# Patient Record
Sex: FEMALE | Race: WHITE | NOT HISPANIC OR LATINO | Employment: FULL TIME | URBAN - METROPOLITAN AREA
[De-identification: names, ages, dates, MRNs, and addresses within clinical notes are randomized per-mention and may not be internally consistent; named-entity substitution may affect disease eponyms.]

---

## 2019-02-22 ENCOUNTER — OFFICE VISIT (OUTPATIENT)
Dept: FAMILY MEDICINE | Facility: CLINIC | Age: 39
End: 2019-02-22
Payer: MEDICAID

## 2019-02-22 VITALS
BODY MASS INDEX: 27.8 KG/M2 | HEIGHT: 66 IN | RESPIRATION RATE: 18 BRPM | TEMPERATURE: 98 F | DIASTOLIC BLOOD PRESSURE: 86 MMHG | WEIGHT: 173 LBS | OXYGEN SATURATION: 98 % | HEART RATE: 87 BPM | SYSTOLIC BLOOD PRESSURE: 138 MMHG

## 2019-02-22 DIAGNOSIS — B07.9 VIRAL WARTS, UNSPECIFIED TYPE: ICD-10-CM

## 2019-02-22 DIAGNOSIS — N92.6 IRREGULAR PERIODS: ICD-10-CM

## 2019-02-22 DIAGNOSIS — D48.5 NEOPLASM OF UNCERTAIN BEHAVIOR OF SKIN: ICD-10-CM

## 2019-02-22 DIAGNOSIS — E78.5 HYPERLIPIDEMIA, UNSPECIFIED HYPERLIPIDEMIA TYPE: ICD-10-CM

## 2019-02-22 DIAGNOSIS — D64.9 ANEMIA, UNSPECIFIED TYPE: Primary | ICD-10-CM

## 2019-02-22 DIAGNOSIS — I10 HYPERTENSION, UNSPECIFIED TYPE: ICD-10-CM

## 2019-02-22 DIAGNOSIS — Z12.4 SCREENING FOR MALIGNANT NEOPLASM OF CERVIX: ICD-10-CM

## 2019-02-22 PROCEDURE — 99214 OFFICE O/P EST MOD 30 MIN: CPT | Mod: 25,,, | Performed by: FAMILY MEDICINE

## 2019-02-22 PROCEDURE — 11300 PR SHAV SKIN LES < 0.5 CM TRUNK,ARM,LEG: ICD-10-PCS | Mod: ,,, | Performed by: FAMILY MEDICINE

## 2019-02-22 PROCEDURE — 99214 PR OFFICE/OUTPT VISIT, EST, LEVL IV, 30-39 MIN: ICD-10-PCS | Mod: 25,,, | Performed by: FAMILY MEDICINE

## 2019-02-22 PROCEDURE — 17110 PR DESTRUCTION BENIGN LESIONS UP TO 14: ICD-10-PCS | Mod: ,,, | Performed by: FAMILY MEDICINE

## 2019-02-22 PROCEDURE — 11300 SHAVE SKIN LESION 0.5 CM/<: CPT | Mod: ,,, | Performed by: FAMILY MEDICINE

## 2019-02-22 PROCEDURE — 17110 DESTRUCTION B9 LES UP TO 14: CPT | Mod: ,,, | Performed by: FAMILY MEDICINE

## 2019-02-22 RX ORDER — VALACYCLOVIR HYDROCHLORIDE 1 G/1
TABLET, FILM COATED ORAL
Refills: 5 | COMMUNITY
Start: 2019-01-21 | End: 2020-05-20 | Stop reason: SDUPTHER

## 2019-02-22 RX ORDER — HYDROCHLOROTHIAZIDE 25 MG/1
TABLET ORAL
Refills: 1 | COMMUNITY
Start: 2019-01-23 | End: 2019-02-22 | Stop reason: SDUPTHER

## 2019-02-22 RX ORDER — LABETALOL 300 MG/1
0.5 TABLET, FILM COATED ORAL 2 TIMES DAILY
Refills: 1 | COMMUNITY
Start: 2019-01-23 | End: 2019-02-22 | Stop reason: SDUPTHER

## 2019-02-22 RX ORDER — LABETALOL 300 MG/1
300 TABLET, FILM COATED ORAL 2 TIMES DAILY
Qty: 60 TABLET | Refills: 3 | Status: SHIPPED | OUTPATIENT
Start: 2019-02-22 | End: 2019-03-22

## 2019-02-22 RX ORDER — ETONOGESTREL AND ETHINYL ESTRADIOL .12; .015 MG/D; MG/D
1 INSERT, EXTENDED RELEASE VAGINAL
Qty: 1 EACH | Refills: 5 | Status: SHIPPED | OUTPATIENT
Start: 2019-02-22 | End: 2019-07-19 | Stop reason: SDUPTHER

## 2019-02-22 RX ORDER — ETONOGESTREL AND ETHINYL ESTRADIOL .12; .015 MG/D; MG/D
INSERT, EXTENDED RELEASE VAGINAL
Refills: 5 | COMMUNITY
Start: 2019-01-23 | End: 2019-02-22 | Stop reason: SDUPTHER

## 2019-02-22 RX ORDER — HYDROCHLOROTHIAZIDE 25 MG/1
25 TABLET ORAL DAILY
Qty: 30 TABLET | Refills: 5 | Status: SHIPPED | OUTPATIENT
Start: 2019-02-22 | End: 2019-07-23 | Stop reason: SDUPTHER

## 2019-02-22 NOTE — PROGRESS NOTES
Subjective:      Patient ID: Breanna Dawson is a 38 y.o. female.    Chief Complaint: Follow-up      HPI:  38-year-old past medical history of anemia, irregular cycles, hypertension, hyperlipidemia who presents with warts and abnormal lesion in the nose.  Warts began several weeks ago.  Son with similar lesions.  Getting worse and spreading.    Has had lesion in her in her right nostril times several years.  Similar in size.  It itches, gets irritated and hurts at times.    Checks blood pressure at home. blood pressure normally runs well.  No side effects to meds.  Has been taking only 1/2 twice a day of her labetalol due to fatigue with normal dosage.    Past Medical History:   Diagnosis Date    Hypertension      Past Surgical History:   Procedure Laterality Date    CHOLECYSTECTOMY       Family History   Problem Relation Age of Onset    Hypertension Mother     Hypertension Brother      Social History     Socioeconomic History    Marital status: Unknown     Spouse name: Not on file    Number of children: Not on file    Years of education: Not on file    Highest education level: Not on file   Social Needs    Financial resource strain: Not on file    Food insecurity - worry: Not on file    Food insecurity - inability: Not on file    Transportation needs - medical: Not on file    Transportation needs - non-medical: Not on file   Occupational History    Not on file   Tobacco Use    Smoking status: Current Some Day Smoker     Types: Cigarettes   Substance and Sexual Activity    Alcohol use: No     Frequency: Never    Drug use: No    Sexual activity: Not on file   Other Topics Concern    Not on file   Social History Narrative    Not on file     Review of patient's allergies indicates:  No Known Allergies    Review of Systems   Constitutional: Negative for activity change, appetite change, chills, fatigue and fever.   HENT: Negative for congestion, ear pain, postnasal drip, rhinorrhea, sinus pressure,  "sinus pain and sore throat.    Eyes: Negative for pain and redness.   Respiratory: Negative for cough, chest tightness and shortness of breath.    Cardiovascular: Negative for chest pain and leg swelling.   Gastrointestinal: Negative for abdominal distention, abdominal pain, constipation, diarrhea, nausea and vomiting.   Endocrine: Negative for cold intolerance and heat intolerance.   Genitourinary: Negative for dysuria, frequency and hematuria.   Musculoskeletal: Negative for arthralgias, back pain and joint swelling.   Skin: Negative for pallor.   Neurological: Negative for dizziness and light-headedness.   Psychiatric/Behavioral: Negative for agitation, decreased concentration and hallucinations. The patient is not nervous/anxious.        Objective:       /86 (BP Location: Left arm, Patient Position: Sitting, BP Method: Medium (Manual))   Pulse 87   Temp 98.3 °F (36.8 °C) (Oral)   Resp 18   Ht 5' 6" (1.676 m)   Wt 78.5 kg (173 lb)   SpO2 98%   BMI 27.92 kg/m²   Physical Exam   Constitutional: She is oriented to person, place, and time. She appears well-developed and well-nourished.   HENT:   Head: Normocephalic and atraumatic.   Nose: Nose normal.   Eyes: Conjunctivae and EOM are normal. Pupils are equal, round, and reactive to light.   Neck: Normal range of motion. Neck supple.   Pulmonary/Chest: Effort normal.   Abdominal: Soft.   Musculoskeletal: Normal range of motion.   Neurological: She is alert and oriented to person, place, and time.   Skin: Skin is warm and dry.   3 warts on left hand - 2-3 mm in size    2 mm hyperkeratotic lesion right inner nostril.    Psychiatric: She has a normal mood and affect. Her behavior is normal. Thought content normal.       Assessment:     1. Anemia, unspecified type    2. Hypertension, unspecified type    3. Hyperlipidemia, unspecified hyperlipidemia type    4. Irregular periods    5. Screening for malignant neoplasm of cervix    6. Viral warts, unspecified " type    7. Neoplasm of uncertain behavior of skin        Plan:   Anemia, unspecified type  -     NUVARING 0.12-0.015 mg/24 hr vaginal ring; Place 1 each vaginally every 28 days.  Dispense: 1 each; Refill: 5    Hypertension, unspecified type  -     hydroCHLOROthiazide (HYDRODIURIL) 25 MG tablet; Take 1 tablet (25 mg total) by mouth once daily.  Dispense: 30 tablet; Refill: 5  -     labetalol (NORMODYNE) 300 MG tablet; Take 1 tablet (300 mg total) by mouth 2 (two) times daily.  Dispense: 60 tablet; Refill: 3    Hyperlipidemia, unspecified hyperlipidemia type    Irregular periods  -     NUVARING 0.12-0.015 mg/24 hr vaginal ring; Place 1 each vaginally every 28 days.  Dispense: 1 each; Refill: 5    Screening for malignant neoplasm of cervix    Viral warts, unspecified type  -     Cryotherapy    Neoplasm of uncertain behavior of skin  -     Excision of Benign Lesion      Medication List with Changes/Refills   Current Medications    VALACYCLOVIR (VALTREX) 1000 MG TABLET    TK 2 TS PO Q 12 H   Changed and/or Refilled Medications    Modified Medication Previous Medication    HYDROCHLOROTHIAZIDE (HYDRODIURIL) 25 MG TABLET hydroCHLOROthiazide (HYDRODIURIL) 25 MG tablet       Take 1 tablet (25 mg total) by mouth once daily.    TK 1 T PO QD    LABETALOL (NORMODYNE) 300 MG TABLET labetalol (NORMODYNE) 300 MG tablet       Take 1 tablet (300 mg total) by mouth 2 (two) times daily.    Take 0.5 tablets by mouth 2 (two) times daily.    NUVARING 0.12-0.015 MG/24 HR VAGINAL RING NUVARING 0.12-0.015 mg/24 hr vaginal ring       Place 1 each vaginally every 28 days.

## 2019-02-22 NOTE — PROCEDURES
"Cryotherapy  Date/Time: 2/22/2019 1:39 PM  Performed by: Eugenio Awan MD  Authorized by: Eugenio Awan MD     Consent Done?:  Yes (Verbal)  Time out: Immediately prior to procedure a "time out" was called to verify the correct patient, procedure, equipment, support staff and site/side marked as required.      Indications:  Wart    Location(s):    Upper Extremity:  Hand        Detail:  left long finger and left ring finger   cryotherapy performed x 2 on 3 warts.   Excision of Benign Lesion  Date/Time: 2/22/2019 1:40 PM  Performed by: Eugenio Awan MD  Authorized by: Eugenio Awan MD     Consent Done?:  Yes (Verbal)  Time out: Immediately prior to procedure a "time out" was called to verify the correct patient, procedure, equipment, support staff and site/side marked as required.    INDICATIONS:: unknown skin neoplasm.   LOCATION:  Location: left nostril.     PREP:Position:  Supine    ANESTHESIA:  Anesthesia:  Local infiltration  Local anesthetic:  Lidocaine 1% without epinephrine    PROCEDURE DETAILS:  Excision type:  Skin  Malignancy:  Benign  Excision size (cm):  0.2  Scalpel size:  15  Incision type:  Other (comments) (shaved)  Specimens?: Yes    Needle, instrument, and sponge counts were correct.  Patient tolerated the procedure well with no immediate complications.  Post-operative instructions were provided for the patient.      "

## 2019-02-28 ENCOUNTER — TELEPHONE (OUTPATIENT)
Dept: FAMILY MEDICINE | Facility: CLINIC | Age: 39
End: 2019-02-28

## 2019-03-22 ENCOUNTER — OFFICE VISIT (OUTPATIENT)
Dept: FAMILY MEDICINE | Facility: CLINIC | Age: 39
End: 2019-03-22
Payer: MEDICAID

## 2019-03-22 VITALS
SYSTOLIC BLOOD PRESSURE: 150 MMHG | RESPIRATION RATE: 18 BRPM | BODY MASS INDEX: 27.8 KG/M2 | OXYGEN SATURATION: 98 % | TEMPERATURE: 98 F | HEIGHT: 66 IN | HEART RATE: 84 BPM | WEIGHT: 173 LBS | DIASTOLIC BLOOD PRESSURE: 80 MMHG

## 2019-03-22 DIAGNOSIS — I10 HYPERTENSION, UNSPECIFIED TYPE: ICD-10-CM

## 2019-03-22 DIAGNOSIS — N63.0 BREAST LUMP IN LOWER INNER QUADRANT: ICD-10-CM

## 2019-03-22 DIAGNOSIS — B07.9 VIRAL WARTS, UNSPECIFIED TYPE: Primary | ICD-10-CM

## 2019-03-22 DIAGNOSIS — L60.0 INGROWN TOENAIL: ICD-10-CM

## 2019-03-22 PROCEDURE — 17110 DESTRUCTION B9 LES UP TO 14: CPT | Mod: S$GLB,,, | Performed by: FAMILY MEDICINE

## 2019-03-22 PROCEDURE — 99214 OFFICE O/P EST MOD 30 MIN: CPT | Mod: 25,S$GLB,, | Performed by: FAMILY MEDICINE

## 2019-03-22 PROCEDURE — 99214 PR OFFICE/OUTPT VISIT, EST, LEVL IV, 30-39 MIN: ICD-10-PCS | Mod: 25,S$GLB,, | Performed by: FAMILY MEDICINE

## 2019-03-22 PROCEDURE — 17110 PR DESTRUCTION BENIGN LESIONS UP TO 14: ICD-10-PCS | Mod: S$GLB,,, | Performed by: FAMILY MEDICINE

## 2019-03-22 RX ORDER — NIFEDIPINE 30 MG/1
30 TABLET, EXTENDED RELEASE ORAL DAILY
Qty: 30 TABLET | Refills: 1 | Status: SHIPPED | OUTPATIENT
Start: 2019-03-22 | End: 2019-07-23

## 2019-03-22 NOTE — PROCEDURES
"Cryotherapy  Date/Time: 3/22/2019 1:42 PM  Performed by: Eugenio Awan MD  Authorized by: Eugenio Awan MD     Consent Done?:  Yes (Verbal)  Time out: Immediately prior to procedure a "time out" was called to verify the correct patient, procedure, equipment, support staff and site/side marked as required.      Indications:  Wart    Location(s):    Upper Extremity:  Hand        Detail:  right index finger      "

## 2019-03-22 NOTE — PROGRESS NOTES
Subjective:      Patient ID: Breanna Dawson is a 38 y.o. female.    Chief Complaint: Follow-up      HPI:  38-year-old white female past medical history of hypertension who presents with multiple complaints.  Reports 2 warts improved in her right hand.  Still has a small wart on her right index finger.    Reports some pain in her right 2nd toe.  Noticed it this morning.  Has some redness.    Reports a small breast lump.  Noticed 1 month ago.  Not getting bigger or smaller.  Nontender.  Thought it was skin bumps and tried to put some salve on it but it did not help.  No family history of breast cancer.    Reports labetalol is not controlling her blood pressure.  Blood pressure elevated at times.  Blood pressure low at times.  Gets dizzy and lightheaded when her blood pressure drops.  Taking HCTZ as directed.    Past Medical History:   Diagnosis Date    Hypertension      Past Surgical History:   Procedure Laterality Date    CHOLECYSTECTOMY       Family History   Problem Relation Age of Onset    Hypertension Mother     Hypertension Brother      Social History     Socioeconomic History    Marital status: Unknown     Spouse name: Not on file    Number of children: Not on file    Years of education: Not on file    Highest education level: Not on file   Social Needs    Financial resource strain: Not on file    Food insecurity - worry: Not on file    Food insecurity - inability: Not on file    Transportation needs - medical: Not on file    Transportation needs - non-medical: Not on file   Occupational History    Not on file   Tobacco Use    Smoking status: Current Some Day Smoker     Types: Cigarettes   Substance and Sexual Activity    Alcohol use: No     Frequency: Never    Drug use: No    Sexual activity: Not on file   Other Topics Concern    Not on file   Social History Narrative    Not on file     Review of patient's allergies indicates:  No Known Allergies    Review of Systems   Constitutional:  "Negative for activity change, appetite change, chills, fatigue and fever.   HENT: Negative for congestion, ear pain, postnasal drip, rhinorrhea, sinus pressure, sinus pain and sore throat.    Eyes: Negative for pain and redness.   Respiratory: Negative for cough, chest tightness and shortness of breath.    Cardiovascular: Negative for chest pain and leg swelling.   Gastrointestinal: Negative for abdominal distention, abdominal pain, constipation, diarrhea, nausea and vomiting.   Endocrine: Negative for cold intolerance and heat intolerance.   Genitourinary: Negative for dysuria, frequency and hematuria.   Musculoskeletal: Negative for arthralgias, back pain and joint swelling.   Skin: Positive for rash and wound. Negative for pallor.   Neurological: Positive for light-headedness. Negative for dizziness.   Psychiatric/Behavioral: Negative for agitation, decreased concentration and hallucinations. The patient is not nervous/anxious.        Objective:       BP (!) 150/80 (BP Location: Left leg, Patient Position: Sitting, BP Method: Medium (Manual))   Pulse 84   Temp 98.4 °F (36.9 °C) (Oral)   Resp 18   Ht 5' 6" (1.676 m)   Wt 78.5 kg (173 lb)   SpO2 98%   BMI 27.92 kg/m²   Physical Exam   Constitutional: She is oriented to person, place, and time. She appears well-developed and well-nourished.   HENT:   Head: Normocephalic and atraumatic.   Nose: Nose normal.   Eyes: Conjunctivae and EOM are normal. Pupils are equal, round, and reactive to light.   Neck: Normal range of motion. Neck supple.   Cardiovascular: Normal rate, regular rhythm and normal heart sounds.   Pulmonary/Chest: Effort normal and breath sounds normal.   Abdominal: Soft.   Musculoskeletal: Normal range of motion.   Neurological: She is alert and oriented to person, place, and time.   Skin: Skin is warm and dry.   2 mm wart right distal index finger    1 x 1 cm likely cyst located on right lower inner breast, no axilllary lymphadenopathy noted.  "   Psychiatric: She has a normal mood and affect. Her behavior is normal. Thought content normal.       Assessment:     1. Viral warts, unspecified type    2. Hypertension, unspecified type    3. Ingrown toenail    4. Breast lump in lower inner quadrant        Plan:   Viral warts, unspecified type  Comments:  cryotherapy applied in clinic.   Orders:  -     Cryotherapy    Hypertension, unspecified type  -     NIFEdipine (PROCARDIA-XL) 30 MG (OSM) 24 hr tablet; Take 1 tablet (30 mg total) by mouth once daily.  Dispense: 30 tablet; Refill: 1    Ingrown toenail    Breast lump in lower inner quadrant  Comments:  Schedule us. Likely benign. no family hx.   Orders:  -     US Breast Right Complete; Future; Expected date: 03/22/2019      Consider nail avulsion.  Use cotton with stat elevate nail at this time.  Epsom salts twice a day.    Stop labetalol start nifedipine.    schedule ultrasound.    Medication List with Changes/Refills   New Medications    NIFEDIPINE (PROCARDIA-XL) 30 MG (OSM) 24 HR TABLET    Take 1 tablet (30 mg total) by mouth once daily.   Current Medications    HYDROCHLOROTHIAZIDE (HYDRODIURIL) 25 MG TABLET    Take 1 tablet (25 mg total) by mouth once daily.    NUVARING 0.12-0.015 MG/24 HR VAGINAL RING    Place 1 each vaginally every 28 days.    VALACYCLOVIR (VALTREX) 1000 MG TABLET    TK 2 TS PO Q 12 H   Discontinued Medications    LABETALOL (NORMODYNE) 300 MG TABLET    Take 1 tablet (300 mg total) by mouth 2 (two) times daily.

## 2019-03-27 ENCOUNTER — TELEPHONE (OUTPATIENT)
Dept: FAMILY MEDICINE | Facility: CLINIC | Age: 39
End: 2019-03-27

## 2019-03-27 NOTE — TELEPHONE ENCOUNTER
Called no answer called about the procardia left message to stop taking the procardia and symptoms worsen call the clinic back and make appointment and to call the clinic back if have any questions

## 2019-07-19 DIAGNOSIS — D64.9 ANEMIA, UNSPECIFIED TYPE: ICD-10-CM

## 2019-07-19 DIAGNOSIS — N92.6 IRREGULAR PERIODS: ICD-10-CM

## 2019-07-19 RX ORDER — ETONOGESTREL AND ETHINYL ESTRADIOL .12; .015 MG/D; MG/D
1 INSERT, EXTENDED RELEASE VAGINAL
Qty: 1 EACH | Refills: 5 | Status: SHIPPED | OUTPATIENT
Start: 2019-07-19 | End: 2019-08-16 | Stop reason: SDUPTHER

## 2019-07-23 ENCOUNTER — OFFICE VISIT (OUTPATIENT)
Dept: FAMILY MEDICINE | Facility: CLINIC | Age: 39
End: 2019-07-23
Payer: MEDICAID

## 2019-07-23 VITALS
DIASTOLIC BLOOD PRESSURE: 112 MMHG | HEIGHT: 66 IN | SYSTOLIC BLOOD PRESSURE: 166 MMHG | TEMPERATURE: 98 F | HEART RATE: 89 BPM | WEIGHT: 174 LBS | RESPIRATION RATE: 18 BRPM | OXYGEN SATURATION: 98 % | BODY MASS INDEX: 27.97 KG/M2

## 2019-07-23 DIAGNOSIS — G43.909 MIGRAINE WITHOUT STATUS MIGRAINOSUS, NOT INTRACTABLE, UNSPECIFIED MIGRAINE TYPE: Primary | ICD-10-CM

## 2019-07-23 DIAGNOSIS — I10 HYPERTENSION, UNSPECIFIED TYPE: ICD-10-CM

## 2019-07-23 PROCEDURE — 99214 OFFICE O/P EST MOD 30 MIN: CPT | Mod: S$GLB,,, | Performed by: FAMILY MEDICINE

## 2019-07-23 PROCEDURE — 99214 PR OFFICE/OUTPT VISIT, EST, LEVL IV, 30-39 MIN: ICD-10-PCS | Mod: S$GLB,,, | Performed by: FAMILY MEDICINE

## 2019-07-23 RX ORDER — METOPROLOL SUCCINATE 25 MG/1
25 TABLET, EXTENDED RELEASE ORAL DAILY
Qty: 30 TABLET | Refills: 1 | Status: SHIPPED | OUTPATIENT
Start: 2019-07-23 | End: 2019-08-13 | Stop reason: SDUPTHER

## 2019-07-23 RX ORDER — ONDANSETRON 8 MG/1
8 TABLET, ORALLY DISINTEGRATING ORAL
Refills: 0 | COMMUNITY
Start: 2019-07-17 | End: 2021-10-08

## 2019-07-23 RX ORDER — KETOROLAC TROMETHAMINE 10 MG/1
10 TABLET, FILM COATED ORAL EVERY 6 HOURS PRN
Qty: 20 TABLET | Refills: 0 | Status: SHIPPED | OUTPATIENT
Start: 2019-07-23 | End: 2021-12-15

## 2019-07-23 RX ORDER — HYDROCHLOROTHIAZIDE 25 MG/1
25 TABLET ORAL DAILY
Qty: 30 TABLET | Refills: 1 | Status: SHIPPED | OUTPATIENT
Start: 2019-07-23 | End: 2019-08-16 | Stop reason: SDUPTHER

## 2019-07-23 NOTE — PROGRESS NOTES
Subjective:      Patient ID: Breanna Dawson is a 38 y.o. female.    Chief Complaint: Headache (migraine and blood pressure )      HPI:  38-year-old white female past medical history of hypertension who presents with headache and hypertension.  She recently stopped taking all of her hypertension medication.  Was trying to take natural medication as advised by her chiropractor.  Was taking magnesium.  It helped but gave her diarrhea.  Blood pressure was doing well but has slowly increased over last month.  She was out of her hydrochlorothiazide.  She started taking labetalol again.  It is not helping.  Headache located bilateral.  Occasionally causes nausea.  Worse with loud noises bright sounds. Went to urgent care.  Was given a Toradol shot and help.  No focal deficits.    Past Medical History:   Diagnosis Date    Hypertension      Past Surgical History:   Procedure Laterality Date    CHOLECYSTECTOMY       Family History   Problem Relation Age of Onset    Hypertension Mother     Hypertension Brother      Social History     Socioeconomic History    Marital status:      Spouse name: Not on file    Number of children: Not on file    Years of education: Not on file    Highest education level: Not on file   Occupational History    Not on file   Social Needs    Financial resource strain: Not on file    Food insecurity:     Worry: Not on file     Inability: Not on file    Transportation needs:     Medical: Not on file     Non-medical: Not on file   Tobacco Use    Smoking status: Current Some Day Smoker     Types: Cigarettes   Substance and Sexual Activity    Alcohol use: No     Frequency: Never    Drug use: No    Sexual activity: Not on file   Lifestyle    Physical activity:     Days per week: Not on file     Minutes per session: Not on file    Stress: Not on file   Relationships    Social connections:     Talks on phone: Not on file     Gets together: Not on file     Attends Bahai service:  "Not on file     Active member of club or organization: Not on file     Attends meetings of clubs or organizations: Not on file     Relationship status: Not on file   Other Topics Concern    Not on file   Social History Narrative    Not on file     Review of patient's allergies indicates:   Allergen Reactions    Procardia [nifedipine] Nausea And Vomiting     Burning for the inside out       Review of Systems   Constitutional: Negative for activity change, appetite change, chills, fatigue and fever.   HENT: Negative for congestion, ear pain, postnasal drip, rhinorrhea, sinus pressure, sinus pain and sore throat.    Eyes: Negative for pain and redness.   Respiratory: Negative for cough, chest tightness and shortness of breath.    Cardiovascular: Negative for chest pain and leg swelling.   Gastrointestinal: Positive for nausea. Negative for abdominal distention, abdominal pain, constipation, diarrhea and vomiting.   Endocrine: Negative for cold intolerance and heat intolerance.   Genitourinary: Negative for dysuria, frequency and hematuria.   Musculoskeletal: Negative for arthralgias, back pain and joint swelling.   Skin: Negative for pallor.   Neurological: Positive for headaches. Negative for dizziness and light-headedness.   Psychiatric/Behavioral: Negative for agitation, decreased concentration and hallucinations. The patient is not nervous/anxious.        Objective:       BP (!) 166/112 (BP Location: Left arm, Patient Position: Sitting, BP Method: Medium (Manual))   Pulse 89   Temp 98.1 °F (36.7 °C) (Oral)   Resp 18   Ht 5' 6" (1.676 m)   Wt 78.9 kg (174 lb)   SpO2 98%   BMI 28.08 kg/m²   Physical Exam   Constitutional: She is oriented to person, place, and time. She appears well-developed and well-nourished.   HENT:   Head: Normocephalic and atraumatic.   Nose: Nose normal.   Eyes: Pupils are equal, round, and reactive to light. Conjunctivae and EOM are normal.   Neck: Normal range of motion. Neck " supple.   Cardiovascular: Normal rate, regular rhythm and normal heart sounds.   Pulmonary/Chest: Effort normal and breath sounds normal.   Abdominal: Soft.   Musculoskeletal: Normal range of motion.   Neurological: She is alert and oriented to person, place, and time.   Skin: Skin is warm and dry.   Psychiatric: She has a normal mood and affect. Her behavior is normal. Thought content normal.       Assessment:     1. Migraine without status migrainosus, not intractable, unspecified migraine type    2. Hypertension, unspecified type        Plan:   Migraine without status migrainosus, not intractable, unspecified migraine type  -     ketorolac (TORADOL) 10 mg tablet; Take 1 tablet (10 mg total) by mouth every 6 (six) hours as needed for Pain.  Dispense: 20 tablet; Refill: 0    Hypertension, unspecified type  Comments:  RENAL US.   Orders:  -     hydroCHLOROthiazide (HYDRODIURIL) 25 MG tablet; Take 1 tablet (25 mg total) by mouth once daily.  Dispense: 30 tablet; Refill: 1  -     metoprolol succinate (TOPROL-XL) 25 MG 24 hr tablet; Take 1 tablet (25 mg total) by mouth once daily.  Dispense: 30 tablet; Refill: 1  -     US Kidney; Future; Expected date: 07/23/2019      Toradol  p.o..    Plan obtain labs on follow-up.    Resume HCTZ.    Trial of Toprol.  May assist with hypertension and migraines.    Will order ultrasound given resistant hypertension    Medication List with Changes/Refills   New Medications    KETOROLAC (TORADOL) 10 MG TABLET    Take 1 tablet (10 mg total) by mouth every 6 (six) hours as needed for Pain.    METOPROLOL SUCCINATE (TOPROL-XL) 25 MG 24 HR TABLET    Take 1 tablet (25 mg total) by mouth once daily.   Current Medications    NUVARING 0.12-0.015 MG/24 HR VAGINAL RING    Place 1 each vaginally every 28 days.    ONDANSETRON (ZOFRAN-ODT) 8 MG TBDL    Take 8 mg by mouth as needed.    VALACYCLOVIR (VALTREX) 1000 MG TABLET    TK 2 TS PO Q 12 H   Changed and/or Refilled Medications    Modified  Medication Previous Medication    HYDROCHLOROTHIAZIDE (HYDRODIURIL) 25 MG TABLET hydroCHLOROthiazide (HYDRODIURIL) 25 MG tablet       Take 1 tablet (25 mg total) by mouth once daily.    Take 1 tablet (25 mg total) by mouth once daily.   Discontinued Medications    NIFEDIPINE (PROCARDIA-XL) 30 MG (OSM) 24 HR TABLET    Take 1 tablet (30 mg total) by mouth once daily.

## 2019-08-13 DIAGNOSIS — I10 HYPERTENSION, UNSPECIFIED TYPE: ICD-10-CM

## 2019-08-13 RX ORDER — METOPROLOL SUCCINATE 50 MG/1
50 TABLET, EXTENDED RELEASE ORAL DAILY
Qty: 30 TABLET | Refills: 2 | Status: SHIPPED | OUTPATIENT
Start: 2019-08-13 | End: 2019-08-16 | Stop reason: SDUPTHER

## 2019-08-14 ENCOUNTER — TELEPHONE (OUTPATIENT)
Dept: FAMILY MEDICINE | Facility: CLINIC | Age: 39
End: 2019-08-14

## 2019-08-14 NOTE — TELEPHONE ENCOUNTER
Called not answer left message on voicemail, that medication for toprol 50 mg was sent to her pharmacy. And to called the clinic if have any questions.

## 2019-08-14 NOTE — TELEPHONE ENCOUNTER
----- Message from Eugenio Awan MD sent at 8/13/2019  5:41 PM CDT -----  Sent script for 50 mg tablet.   ----- Message -----  From: Viv Muñoz LPN  Sent: 8/13/2019   4:32 PM  To: Eugenio Awan MD        ----- Message -----  From: Ruby Urrutia  Sent: 8/13/2019   3:13 PM  To: Lv Elkins Eugenio Staff    Was taking metoprolol succinate (TOPROL-XL) 25 MG 24 hr tablet once a day but doc wanted to double up and have her take 50 mg per day and she is out because she was still on the script for once a day//needs a refill for the twice a day or the 50 mg

## 2019-08-16 ENCOUNTER — OFFICE VISIT (OUTPATIENT)
Dept: FAMILY MEDICINE | Facility: CLINIC | Age: 39
End: 2019-08-16
Payer: MEDICAID

## 2019-08-16 VITALS
BODY MASS INDEX: 28.77 KG/M2 | RESPIRATION RATE: 18 BRPM | HEART RATE: 86 BPM | HEIGHT: 66 IN | WEIGHT: 179 LBS | TEMPERATURE: 99 F | SYSTOLIC BLOOD PRESSURE: 128 MMHG | DIASTOLIC BLOOD PRESSURE: 82 MMHG | OXYGEN SATURATION: 98 %

## 2019-08-16 DIAGNOSIS — Z82.49 FAMILY HISTORY OF EARLY CAD: ICD-10-CM

## 2019-08-16 DIAGNOSIS — N92.6 IRREGULAR PERIODS: ICD-10-CM

## 2019-08-16 DIAGNOSIS — E78.5 HYPERLIPIDEMIA, UNSPECIFIED HYPERLIPIDEMIA TYPE: ICD-10-CM

## 2019-08-16 DIAGNOSIS — D64.9 ANEMIA, UNSPECIFIED TYPE: ICD-10-CM

## 2019-08-16 DIAGNOSIS — R00.2 PALPITATIONS: ICD-10-CM

## 2019-08-16 DIAGNOSIS — Z91.89 AT RISK FOR CORONARY ARTERY DISEASE: ICD-10-CM

## 2019-08-16 DIAGNOSIS — I10 HYPERTENSION, UNSPECIFIED TYPE: ICD-10-CM

## 2019-08-16 DIAGNOSIS — I10 HYPERTENSION, UNSPECIFIED TYPE: Primary | ICD-10-CM

## 2019-08-16 PROCEDURE — 93000 ELECTROCARDIOGRAM COMPLETE: CPT | Mod: S$GLB,,, | Performed by: FAMILY MEDICINE

## 2019-08-16 PROCEDURE — 93000 PR ELECTROCARDIOGRAM, COMPLETE: ICD-10-PCS | Mod: S$GLB,,, | Performed by: FAMILY MEDICINE

## 2019-08-16 PROCEDURE — 99214 PR OFFICE/OUTPT VISIT, EST, LEVL IV, 30-39 MIN: ICD-10-PCS | Mod: S$GLB,,, | Performed by: FAMILY MEDICINE

## 2019-08-16 PROCEDURE — 99214 OFFICE O/P EST MOD 30 MIN: CPT | Mod: S$GLB,,, | Performed by: FAMILY MEDICINE

## 2019-08-16 RX ORDER — METOPROLOL SUCCINATE 50 MG/1
50 TABLET, EXTENDED RELEASE ORAL DAILY
Qty: 30 TABLET | Refills: 5 | Status: SHIPPED | OUTPATIENT
Start: 2019-08-16 | End: 2019-11-22 | Stop reason: SDUPTHER

## 2019-08-16 RX ORDER — ETONOGESTREL AND ETHINYL ESTRADIOL .12; .015 MG/D; MG/D
INSERT, EXTENDED RELEASE VAGINAL
Qty: 1 EACH | Refills: 5 | Status: SHIPPED | OUTPATIENT
Start: 2019-08-16 | End: 2019-11-22 | Stop reason: SDUPTHER

## 2019-08-16 RX ORDER — HYDROCHLOROTHIAZIDE 25 MG/1
25 TABLET ORAL DAILY
Qty: 30 TABLET | Refills: 5 | Status: SHIPPED | OUTPATIENT
Start: 2019-08-16 | End: 2019-11-22 | Stop reason: SDUPTHER

## 2019-08-16 NOTE — PROGRESS NOTES
Subjective:      Patient ID: Breanna Dawson is a 39 y.o. female.    Chief Complaint: Follow-up      HPI:  39-year-old white female past history of hypertension and anemia who presents with hypertension.  Reports blood pressure controlled on metoprolol and HCTZ.  Only side effect noted was some palpitations while running.  Has been trying to exercise.  Headaches have improved.  Did not need Toradol.  Reports strong family history of coronary artery disease. Mother and aunt with heart problems in their 40s.  Feels well otherwise.    Past Medical History:   Diagnosis Date    Hypertension      Past Surgical History:   Procedure Laterality Date    CHOLECYSTECTOMY       Family History   Problem Relation Age of Onset    Hypertension Mother     Hypertension Brother      Social History     Socioeconomic History    Marital status:      Spouse name: Not on file    Number of children: Not on file    Years of education: Not on file    Highest education level: Not on file   Occupational History    Not on file   Social Needs    Financial resource strain: Not on file    Food insecurity:     Worry: Not on file     Inability: Not on file    Transportation needs:     Medical: Not on file     Non-medical: Not on file   Tobacco Use    Smoking status: Current Some Day Smoker     Types: Cigarettes   Substance and Sexual Activity    Alcohol use: No     Frequency: Never    Drug use: No    Sexual activity: Not on file   Lifestyle    Physical activity:     Days per week: Not on file     Minutes per session: Not on file    Stress: Not on file   Relationships    Social connections:     Talks on phone: Not on file     Gets together: Not on file     Attends Gnosticist service: Not on file     Active member of club or organization: Not on file     Attends meetings of clubs or organizations: Not on file     Relationship status: Not on file   Other Topics Concern    Not on file   Social History Narrative    Not on file  "    Review of patient's allergies indicates:   Allergen Reactions    Procardia [nifedipine] Nausea And Vomiting     Burning for the inside out       Review of Systems   Constitutional: Negative for activity change, appetite change, chills, fatigue and fever.   HENT: Negative for congestion, ear pain, postnasal drip, rhinorrhea, sinus pressure, sinus pain and sore throat.    Eyes: Negative for pain and redness.   Respiratory: Negative for cough, chest tightness and shortness of breath.    Cardiovascular: Negative for chest pain and leg swelling.   Gastrointestinal: Negative for abdominal distention, abdominal pain, constipation, diarrhea, nausea and vomiting.   Endocrine: Negative for cold intolerance and heat intolerance.   Genitourinary: Negative for dysuria, frequency and hematuria.   Musculoskeletal: Negative for arthralgias, back pain and joint swelling.   Skin: Negative for pallor.   Neurological: Negative for dizziness and light-headedness.   Psychiatric/Behavioral: Negative for agitation, decreased concentration and hallucinations. The patient is not nervous/anxious.        Objective:       /82 (BP Location: Left arm, Patient Position: Sitting, BP Method: Medium (Manual))   Pulse 86   Temp 98.6 °F (37 °C) (Oral)   Resp 18   Ht 5' 6" (1.676 m)   Wt 81.2 kg (179 lb)   SpO2 98%   BMI 28.89 kg/m²   Physical Exam   Constitutional: She is oriented to person, place, and time. She appears well-developed and well-nourished.   HENT:   Head: Normocephalic and atraumatic.   Nose: Nose normal.   Eyes: Pupils are equal, round, and reactive to light. Conjunctivae and EOM are normal.   Neck: Normal range of motion. Neck supple.   Cardiovascular: Normal rate, regular rhythm and normal heart sounds.   Pulmonary/Chest: Effort normal and breath sounds normal.   Abdominal: Soft.   Musculoskeletal: Normal range of motion.   Neurological: She is alert and oriented to person, place, and time.   Skin: Skin is warm and " dry.   Psychiatric: She has a normal mood and affect. Her behavior is normal. Thought content normal.       Assessment:     1. Hypertension, unspecified type    2. Hyperlipidemia, unspecified hyperlipidemia type    3. Anemia, unspecified type    4. Hypertension, unspecified type    5. Irregular periods    6. At risk for coronary artery disease    7. Family history of early CAD    8. Palpitations        Plan:   Hypertension, unspecified type  -     Comprehensive metabolic panel; Future; Expected date: 08/16/2019  -     Lipid panel; Future; Expected date: 08/16/2019  -     hydroCHLOROthiazide (HYDRODIURIL) 25 MG tablet; Take 1 tablet (25 mg total) by mouth once daily.  Dispense: 30 tablet; Refill: 5  -     metoprolol succinate (TOPROL-XL) 50 MG 24 hr tablet; Take 1 tablet (50 mg total) by mouth once daily.  Dispense: 30 tablet; Refill: 5    Hyperlipidemia, unspecified hyperlipidemia type  -     Comprehensive metabolic panel; Future; Expected date: 08/16/2019  -     Lipid panel; Future; Expected date: 08/16/2019    Anemia, unspecified type  -     CBC auto differential; Future; Expected date: 08/16/2019  -     NUVARING 0.12-0.015 mg/24 hr vaginal ring; One ring vaginally and leave in place for 3 consecutive weeks, then remove for 1 week. Insert new ring 7 days after removal.  Dispense: 1 each; Refill: 5    Hypertension, unspecified type  Comments:  RENAL US.   Orders:  -     Comprehensive metabolic panel; Future; Expected date: 08/16/2019  -     Lipid panel; Future; Expected date: 08/16/2019  -     hydroCHLOROthiazide (HYDRODIURIL) 25 MG tablet; Take 1 tablet (25 mg total) by mouth once daily.  Dispense: 30 tablet; Refill: 5  -     metoprolol succinate (TOPROL-XL) 50 MG 24 hr tablet; Take 1 tablet (50 mg total) by mouth once daily.  Dispense: 30 tablet; Refill: 5    Irregular periods  -     NUVARING 0.12-0.015 mg/24 hr vaginal ring; One ring vaginally and leave in place for 3 consecutive weeks, then remove for 1 week.  Insert new ring 7 days after removal.  Dispense: 1 each; Refill: 5    At risk for coronary artery disease  -     EKG 12-lead  -     CT Calcium Scoring Cardiac; Future; Expected date: 08/16/2019    Family history of early CAD  -     EKG 12-lead  -     CT Calcium Scoring Cardiac; Future; Expected date: 08/16/2019    Palpitations  -     Holter monitor - 48 hour; Future      I have independently reviewed imaging ordered and obtained today. Findings: NSR, Normal axis, No st t wave changes    Normal EKG.  Order CT calcium score.    Lipid pending.    Consider statin.    Order Holter monitor.    Continue HCTZ and Toprol.  Discussed ultrasound in detail with patient.  Discussed importance of continued blood pressure control.  Goal blood pressure 120/80.    Follow-up in 3 months.      Medication List with Changes/Refills   Current Medications    KETOROLAC (TORADOL) 10 MG TABLET    Take 1 tablet (10 mg total) by mouth every 6 (six) hours as needed for Pain.    ONDANSETRON (ZOFRAN-ODT) 8 MG TBDL    Take 8 mg by mouth as needed.    VALACYCLOVIR (VALTREX) 1000 MG TABLET    TK 2 TS PO Q 12 H   Changed and/or Refilled Medications    Modified Medication Previous Medication    HYDROCHLOROTHIAZIDE (HYDRODIURIL) 25 MG TABLET hydroCHLOROthiazide (HYDRODIURIL) 25 MG tablet       Take 1 tablet (25 mg total) by mouth once daily.    Take 1 tablet (25 mg total) by mouth once daily.    METOPROLOL SUCCINATE (TOPROL-XL) 50 MG 24 HR TABLET metoprolol succinate (TOPROL-XL) 50 MG 24 hr tablet       Take 1 tablet (50 mg total) by mouth once daily.    Take 1 tablet (50 mg total) by mouth once daily.    NUVARING 0.12-0.015 MG/24 HR VAGINAL RING NUVARING 0.12-0.015 mg/24 hr vaginal ring       One ring vaginally and leave in place for 3 consecutive weeks, then remove for 1 week. Insert new ring 7 days after removal.    Place 1 each vaginally every 28 days.

## 2019-08-17 LAB
ABS NRBC COUNT: 0 X 10 3/UL (ref 0–0.01)
ABSOLUTE BASOPHIL: 0.04 X 10 3/UL (ref 0–0.22)
ABSOLUTE EOSINOPHIL: 0.07 X 10 3/UL (ref 0.04–0.54)
ABSOLUTE IMMATURE GRAN: 0.02 X 10 3/UL (ref 0–0.04)
ABSOLUTE LYMPHOCYTE: 1.76 X 10 3/UL (ref 0.86–4.75)
ABSOLUTE MONOCYTE: 0.45 X 10 3/UL (ref 0.22–1.08)
ALBUMIN SERPL-MCNC: 4.8 G/DL (ref 3.5–5.2)
ALBUMIN/GLOB SERPL ELPH: 2.2 {RATIO} (ref 1–2.7)
ALP ISOS SERPL LEV INH-CCNC: 72 IU/L (ref 35–105)
ALT (SGPT): 19 U/L (ref 0–33)
ANION GAP SERPL CALC-SCNC: 12 MMOL/L (ref 8–17)
AST SERPL-CCNC: 13 U/L (ref 0–32)
BASOPHILS NFR BLD: 0.5 %
BILIRUBIN, TOTAL: 0.22 MG/DL (ref 0–1.2)
BUN/CREAT SERPL: 17.3 (ref 6–20)
CALCIUM SERPL-MCNC: 9.4 MG/DL (ref 8.6–10.2)
CARBON DIOXIDE, CO2: 27 MMOL/L (ref 22–29)
CHLORIDE: 101 MMOL/L (ref 98–107)
CHOLEST SERPL-MSCNC: 266 MG/DL (ref 100–200)
CREAT SERPL-MCNC: 0.83 MG/DL (ref 0.5–0.9)
EOSINOPHIL NFR BLD: 0.8 %
GFR ESTIMATION: 76.94
GLOBULIN: 2.2 G/DL (ref 1.5–4.5)
GLUCOSE: 94 MG/DL (ref 74–106)
HCT VFR BLD AUTO: 41.4 % (ref 37–47)
HDLC SERPL-MCNC: 83 MG/DL
HGB BLD-MCNC: 13.5 G/DL (ref 12–16)
IMMATURE GRANULOCYTES: 0.2 % (ref 0–0.5)
LDL/HDL RATIO: 2 (ref 1–3)
LDLC SERPL CALC-MCNC: 166.2 MG/DL (ref 0–100)
LYMPHOCYTES NFR BLD: 20 %
MCH RBC QN AUTO: 27.8 PG (ref 27–32)
MCHC RBC AUTO-ENTMCNC: 32.6 G/DL (ref 32–36)
MCV RBC AUTO: 85.2 FL (ref 82–100)
MONOCYTES NFR BLD: 5.1 %
NEUTROPHILS ABSOLUTE COUNT: 6.46 X 10 3/UL (ref 2.15–7.56)
NEUTROPHILS NFR BLD: 73.4 %
NUCLEATED RED BLOOD CELLS: 0 /100 WBC (ref 0–0.2)
PLATELET # BLD AUTO: 271 X 10 3/UL (ref 135–400)
POTASSIUM: 4 MMOL/L (ref 3.5–5.1)
PROT SNV-MCNC: 7 G/DL (ref 6.4–8.3)
RBC # BLD AUTO: 4.86 X 10 6/UL (ref 4.2–5.4)
RDW-SD: 42.8 FL (ref 37–54)
SODIUM: 140 MMOL/L (ref 136–145)
TRIGL SERPL-MCNC: 84 MG/DL (ref 0–150)
UREA NITROGEN (BUN): 14.4 MG/DL (ref 6–20)
WBC # BLD: 8.8 X 10 3/UL (ref 4.3–10.8)

## 2019-09-04 PROBLEM — R00.2 PALPITATIONS: Status: ACTIVE | Noted: 2019-09-04

## 2019-11-22 ENCOUNTER — OFFICE VISIT (OUTPATIENT)
Dept: FAMILY MEDICINE | Facility: CLINIC | Age: 39
End: 2019-11-22
Payer: MEDICAID

## 2019-11-22 VITALS
TEMPERATURE: 98 F | DIASTOLIC BLOOD PRESSURE: 98 MMHG | WEIGHT: 189.38 LBS | SYSTOLIC BLOOD PRESSURE: 142 MMHG | HEART RATE: 78 BPM | RESPIRATION RATE: 18 BRPM | OXYGEN SATURATION: 97 % | BODY MASS INDEX: 30.44 KG/M2 | HEIGHT: 66 IN

## 2019-11-22 DIAGNOSIS — N92.6 IRREGULAR PERIODS: ICD-10-CM

## 2019-11-22 DIAGNOSIS — E78.5 HYPERLIPIDEMIA, UNSPECIFIED HYPERLIPIDEMIA TYPE: Primary | ICD-10-CM

## 2019-11-22 DIAGNOSIS — D64.9 ANEMIA, UNSPECIFIED TYPE: ICD-10-CM

## 2019-11-22 DIAGNOSIS — I10 HYPERTENSION, UNSPECIFIED TYPE: ICD-10-CM

## 2019-11-22 DIAGNOSIS — Z12.4 SCREENING FOR MALIGNANT NEOPLASM OF CERVIX: ICD-10-CM

## 2019-11-22 DIAGNOSIS — R53.83 FATIGUE, UNSPECIFIED TYPE: ICD-10-CM

## 2019-11-22 PROCEDURE — 99214 PR OFFICE/OUTPT VISIT, EST, LEVL IV, 30-39 MIN: ICD-10-PCS | Mod: S$GLB,,, | Performed by: FAMILY MEDICINE

## 2019-11-22 PROCEDURE — 99214 OFFICE O/P EST MOD 30 MIN: CPT | Mod: S$GLB,,, | Performed by: FAMILY MEDICINE

## 2019-11-22 RX ORDER — HYDROCHLOROTHIAZIDE 25 MG/1
25 TABLET ORAL DAILY
Qty: 30 TABLET | Refills: 5 | Status: SHIPPED | OUTPATIENT
Start: 2019-11-22 | End: 2020-11-22 | Stop reason: SDUPTHER

## 2019-11-22 RX ORDER — ETONOGESTREL AND ETHINYL ESTRADIOL .12; .015 MG/D; MG/D
INSERT, EXTENDED RELEASE VAGINAL
Qty: 1 EACH | Refills: 5 | Status: SHIPPED | OUTPATIENT
Start: 2019-11-22 | End: 2020-06-03 | Stop reason: SDUPTHER

## 2019-11-22 RX ORDER — METOPROLOL SUCCINATE 50 MG/1
50 TABLET, EXTENDED RELEASE ORAL DAILY
Qty: 30 TABLET | Refills: 5 | Status: SHIPPED | OUTPATIENT
Start: 2019-11-22 | End: 2020-05-20

## 2019-11-22 NOTE — PROGRESS NOTES
Subjective:      Patient ID: Breanna Dawson is a 39 y.o. female.    Chief Complaint: Follow-up (3 months)      HPI:  39-year-old white female past history of hypertension and hyperlipidemia who presents with complaints of weight gain.  Reports no change in diet.  No change in exercise.  Has noticed 10-12 lb weight gain.  Reports eating about the same.  Reports blood pressure controlled.  Normally in the 120s and 130s.  Has not been in the 140s.  Denies any shortness of breath fatigue.  Reports good exercise tolerance.  Doing well with NuvaRing.  Has regular cycles.  She has little upset today.  She was fighting with her child.  He has been disrespectful towards her.    Past Medical History:   Diagnosis Date    Hypertension      Past Surgical History:   Procedure Laterality Date    CHOLECYSTECTOMY       Family History   Problem Relation Age of Onset    Hypertension Mother     Hypertension Brother      Social History     Socioeconomic History    Marital status:      Spouse name: Not on file    Number of children: Not on file    Years of education: Not on file    Highest education level: Not on file   Occupational History    Not on file   Social Needs    Financial resource strain: Not on file    Food insecurity:     Worry: Not on file     Inability: Not on file    Transportation needs:     Medical: Not on file     Non-medical: Not on file   Tobacco Use    Smoking status: Current Some Day Smoker     Types: Cigarettes   Substance and Sexual Activity    Alcohol use: No     Frequency: Never    Drug use: No    Sexual activity: Not on file   Lifestyle    Physical activity:     Days per week: Not on file     Minutes per session: Not on file    Stress: Not on file   Relationships    Social connections:     Talks on phone: Not on file     Gets together: Not on file     Attends Restorationist service: Not on file     Active member of club or organization: Not on file     Attends meetings of clubs or  "organizations: Not on file     Relationship status: Not on file   Other Topics Concern    Not on file   Social History Narrative    Not on file     Review of patient's allergies indicates:   Allergen Reactions    Procardia [nifedipine] Nausea And Vomiting     Burning for the inside out       Review of Systems   Constitutional: Positive for unexpected weight change. Negative for activity change, appetite change, chills, fatigue and fever.   HENT: Negative for congestion, ear pain, postnasal drip, rhinorrhea, sinus pressure, sinus pain and sore throat.    Eyes: Negative for pain and redness.   Respiratory: Negative for cough, chest tightness and shortness of breath.    Cardiovascular: Negative for chest pain and leg swelling.   Gastrointestinal: Negative for abdominal distention, abdominal pain, constipation, diarrhea, nausea and vomiting.   Endocrine: Negative for cold intolerance and heat intolerance.   Genitourinary: Negative for dysuria, frequency and hematuria.   Musculoskeletal: Negative for arthralgias, back pain and joint swelling.   Skin: Negative for pallor.   Neurological: Negative for dizziness and light-headedness.   Psychiatric/Behavioral: Positive for dysphoric mood. Negative for agitation, decreased concentration and hallucinations. The patient is not nervous/anxious.        Objective:       BP (!) 142/98 (BP Location: Left arm, Patient Position: Sitting, BP Method: Medium (Manual))   Pulse 78   Temp 98.2 °F (36.8 °C) (Oral)   Resp 18   Ht 5' 6" (1.676 m)   Wt 85.9 kg (189 lb 6.4 oz)   SpO2 97%   BMI 30.57 kg/m²   Physical Exam   Constitutional: She is oriented to person, place, and time. She appears well-developed and well-nourished.   HENT:   Head: Normocephalic and atraumatic.   Nose: Nose normal.   Eyes: Pupils are equal, round, and reactive to light. Conjunctivae and EOM are normal.   Neck: Normal range of motion. Neck supple.   Cardiovascular: Normal rate, regular rhythm and normal " heart sounds.   Pulmonary/Chest: Effort normal and breath sounds normal.   Abdominal: Soft.   Musculoskeletal: Normal range of motion.   Neurological: She is alert and oriented to person, place, and time.   Skin: Skin is warm and dry.   Psychiatric: She has a normal mood and affect. Her behavior is normal. Thought content normal.       Assessment:     1. Hyperlipidemia, unspecified hyperlipidemia type    2. Hypertension, unspecified type    3. Anemia, unspecified type    4. Fatigue, unspecified type    5. Irregular periods    6. Screening for malignant neoplasm of cervix        Plan:   Hyperlipidemia, unspecified hyperlipidemia type    Hypertension, unspecified type  -     Comprehensive metabolic panel; Future; Expected date: 11/22/2019  -     Lipid panel; Future; Expected date: 11/22/2019  -     hydroCHLOROthiazide (HYDRODIURIL) 25 MG tablet; Take 1 tablet (25 mg total) by mouth once daily.  Dispense: 30 tablet; Refill: 5  -     metoprolol succinate (TOPROL-XL) 50 MG 24 hr tablet; Take 1 tablet (50 mg total) by mouth once daily.  Dispense: 30 tablet; Refill: 5    Anemia, unspecified type  -     CBC auto differential; Future; Expected date: 11/22/2019  -     NUVARING 0.12-0.015 mg/24 hr vaginal ring; One ring vaginally and leave in place for 3 consecutive weeks, then remove for 1 week. Insert new ring 7 days after removal.  Dispense: 1 each; Refill: 5    Fatigue, unspecified type  -     TSH; Future; Expected date: 11/22/2019  -     T4, free; Future; Expected date: 11/22/2019    Irregular periods  -     NUVARING 0.12-0.015 mg/24 hr vaginal ring; One ring vaginally and leave in place for 3 consecutive weeks, then remove for 1 week. Insert new ring 7 days after removal.  Dispense: 1 each; Refill: 5    Screening for malignant neoplasm of cervix      Blood pressure slightly high today but patient reports normal at home.    Continue current meds.    Labs pending.    Follow-up in 3 months.    Medication List with  Changes/Refills   Current Medications    KETOROLAC (TORADOL) 10 MG TABLET    Take 1 tablet (10 mg total) by mouth every 6 (six) hours as needed for Pain.    ONDANSETRON (ZOFRAN-ODT) 8 MG TBDL    Take 8 mg by mouth as needed.    VALACYCLOVIR (VALTREX) 1000 MG TABLET    TK 2 TS PO Q 12 H   Changed and/or Refilled Medications    Modified Medication Previous Medication    HYDROCHLOROTHIAZIDE (HYDRODIURIL) 25 MG TABLET hydroCHLOROthiazide (HYDRODIURIL) 25 MG tablet       Take 1 tablet (25 mg total) by mouth once daily.    Take 1 tablet (25 mg total) by mouth once daily.    METOPROLOL SUCCINATE (TOPROL-XL) 50 MG 24 HR TABLET metoprolol succinate (TOPROL-XL) 50 MG 24 hr tablet       Take 1 tablet (50 mg total) by mouth once daily.    Take 1 tablet (50 mg total) by mouth once daily.    NUVARING 0.12-0.015 MG/24 HR VAGINAL RING NUVARING 0.12-0.015 mg/24 hr vaginal ring       One ring vaginally and leave in place for 3 consecutive weeks, then remove for 1 week. Insert new ring 7 days after removal.    One ring vaginally and leave in place for 3 consecutive weeks, then remove for 1 week. Insert new ring 7 days after removal.

## 2019-11-23 LAB
ABS NRBC COUNT: 0 X 10 3/UL (ref 0–0.01)
ABSOLUTE BASOPHIL: 0.03 X 10 3/UL (ref 0–0.22)
ABSOLUTE EOSINOPHIL: 0.1 X 10 3/UL (ref 0.04–0.54)
ABSOLUTE IMMATURE GRAN: 0.03 X 10 3/UL (ref 0–0.04)
ABSOLUTE LYMPHOCYTE: 1.77 X 10 3/UL (ref 0.86–4.75)
ABSOLUTE MONOCYTE: 0.34 X 10 3/UL (ref 0.22–1.08)
ALBUMIN SERPL-MCNC: 4.3 G/DL (ref 3.5–5.2)
ALBUMIN/GLOB SERPL ELPH: 1.7 {RATIO} (ref 1–2.7)
ALP ISOS SERPL LEV INH-CCNC: 56 U/L (ref 35–105)
ALT (SGPT): 12 U/L (ref 0–33)
ANION GAP SERPL CALC-SCNC: 10 MMOL/L (ref 8–17)
AST SERPL-CCNC: 13 U/L (ref 0–32)
BASOPHILS NFR BLD: 0.4 % (ref 0.2–1.2)
BILIRUBIN, TOTAL: 0.33 MG/DL (ref 0–1.2)
BUN/CREAT SERPL: 17.2 (ref 6–20)
CALCIUM SERPL-MCNC: 9.1 MG/DL (ref 8.6–10.2)
CARBON DIOXIDE, CO2: 26 MMOL/L (ref 22–29)
CHLORIDE: 103 MMOL/L (ref 98–107)
CHOLEST SERPL-MSCNC: 266 MG/DL (ref 100–200)
CREAT SERPL-MCNC: 0.75 MG/DL (ref 0.5–0.9)
EOSINOPHIL NFR BLD: 1.2 % (ref 0.7–7)
GFR ESTIMATION: 86.48
GLOBULIN: 2.5 G/DL (ref 1.5–4.5)
GLUCOSE: 95 MG/DL (ref 74–106)
HCT VFR BLD AUTO: 40.3 % (ref 37–47)
HDLC SERPL-MCNC: 67 MG/DL
HGB BLD-MCNC: 13.7 G/DL (ref 12–16)
IMMATURE GRANULOCYTES: 0.4 % (ref 0–0.5)
LDL/HDL RATIO: 2.2 (ref 1–3)
LDLC SERPL CALC-MCNC: 150.2 MG/DL (ref 0–100)
LYMPHOCYTES NFR BLD: 21.4 % (ref 19.3–53.1)
MCH RBC QN AUTO: 30 PG (ref 27–32)
MCHC RBC AUTO-ENTMCNC: 34 G/DL (ref 32–36)
MCV RBC AUTO: 88.2 FL (ref 82–100)
MONOCYTES NFR BLD: 4.1 % (ref 4.7–12.5)
NEUTROPHILS ABSOLUTE COUNT: 5.99 X 10 3/UL (ref 2.15–7.56)
NEUTROPHILS NFR BLD: 72.5 %
NUCLEATED RED BLOOD CELLS: 0 /100 WBC (ref 0–0.2)
PLATELET # BLD AUTO: 259 X 10 3/UL (ref 135–400)
POTASSIUM: 4 MMOL/L (ref 3.5–5.1)
PROT SNV-MCNC: 6.8 G/DL (ref 6.4–8.3)
RBC # BLD AUTO: 4.57 X 10 6/UL (ref 4.2–5.4)
RDW-SD: 40 FL (ref 37–54)
SODIUM: 139 MMOL/L (ref 136–145)
T4, FREE: 1.23 NG/DL (ref 0.93–1.7)
TRIGL SERPL-MCNC: 244 MG/DL (ref 0–150)
TSH SERPL DL<=0.005 MIU/L-ACNC: 0.89 UIU/ML (ref 0.27–4.2)
UREA NITROGEN (BUN): 12.9 MG/DL (ref 6–20)
WBC # BLD: 8.26 X 10 3/UL (ref 4.3–10.8)

## 2019-12-23 ENCOUNTER — TELEPHONE (OUTPATIENT)
Dept: FAMILY MEDICINE | Facility: CLINIC | Age: 39
End: 2019-12-23

## 2019-12-23 NOTE — TELEPHONE ENCOUNTER
Called no answer left message on voicemail informing patient that medication was called in to her pharmacy. If have any questions to please call the clinic back.

## 2019-12-23 NOTE — TELEPHONE ENCOUNTER
----- Message from Dominique Dash sent at 12/23/2019  1:03 PM CST -----  Contact: Patient  Needs refills on valACYclovir (VALTREX) 1000 MG tablet called into her pharmacy, Please

## 2020-01-22 ENCOUNTER — PATIENT OUTREACH (OUTPATIENT)
Dept: ADMINISTRATIVE | Facility: HOSPITAL | Age: 40
End: 2020-01-22

## 2020-01-22 NOTE — PROGRESS NOTES
Health Maintenance Updated.  Immunizations: Abstracted.  Care Everywhere abstracted.  [facility] abstracted  [facility] abstracted       No records found

## 2020-02-07 ENCOUNTER — OFFICE VISIT (OUTPATIENT)
Dept: FAMILY MEDICINE | Facility: CLINIC | Age: 40
End: 2020-02-07
Payer: MEDICAID

## 2020-02-07 VITALS
TEMPERATURE: 99 F | OXYGEN SATURATION: 98 % | DIASTOLIC BLOOD PRESSURE: 94 MMHG | SYSTOLIC BLOOD PRESSURE: 148 MMHG | RESPIRATION RATE: 18 BRPM | HEART RATE: 70 BPM | WEIGHT: 191 LBS | BODY MASS INDEX: 30.7 KG/M2 | HEIGHT: 66 IN

## 2020-02-07 DIAGNOSIS — E78.5 HYPERLIPIDEMIA, UNSPECIFIED HYPERLIPIDEMIA TYPE: ICD-10-CM

## 2020-02-07 DIAGNOSIS — I10 HYPERTENSION, UNSPECIFIED TYPE: ICD-10-CM

## 2020-02-07 DIAGNOSIS — N63.0 BREAST LUMP IN LOWER INNER QUADRANT: ICD-10-CM

## 2020-02-07 DIAGNOSIS — Z12.4 SCREENING FOR MALIGNANT NEOPLASM OF CERVIX: Primary | ICD-10-CM

## 2020-02-07 DIAGNOSIS — Z72.51 HIGH RISK HETEROSEXUAL BEHAVIOR: ICD-10-CM

## 2020-02-07 DIAGNOSIS — Z11.3 SCREENING EXAMINATION FOR STD (SEXUALLY TRANSMITTED DISEASE): ICD-10-CM

## 2020-02-07 LAB — HUMAN PAPILLOMAVIRUS (HPV): NORMAL

## 2020-02-07 PROCEDURE — 99214 OFFICE O/P EST MOD 30 MIN: CPT | Mod: S$GLB,,, | Performed by: FAMILY MEDICINE

## 2020-02-07 PROCEDURE — 99214 PR OFFICE/OUTPT VISIT, EST, LEVL IV, 30-39 MIN: ICD-10-PCS | Mod: S$GLB,,, | Performed by: FAMILY MEDICINE

## 2020-02-07 NOTE — PROGRESS NOTES
Subjective:      Patient ID: Breanna Dawson is a 39 y.o. female.    Chief Complaint: Follow-up (forgot to take her meds this morning )      HPI:  39-year-old white female past history of hypertension who presents with hypertension and for Pap smear.  Doing well with new ring.  Has regular cycles.  LMP 3 weeks ago.  Blood pressure is running high.  She has been doing some detox.  She attributes to this.  Normally was well controlled.  She denies any breast lumps.  Her grandmother had breast cancer.  She also had a cousin who recently had breast cancer.  She denies any vaginal discharge.    Past Medical History:   Diagnosis Date    Hypertension      Past Surgical History:   Procedure Laterality Date    CHOLECYSTECTOMY       Family History   Problem Relation Age of Onset    Hypertension Mother     Hypertension Brother      Social History     Socioeconomic History    Marital status:      Spouse name: Not on file    Number of children: Not on file    Years of education: Not on file    Highest education level: Not on file   Occupational History    Not on file   Social Needs    Financial resource strain: Not on file    Food insecurity:     Worry: Not on file     Inability: Not on file    Transportation needs:     Medical: Not on file     Non-medical: Not on file   Tobacco Use    Smoking status: Current Some Day Smoker     Types: Cigarettes   Substance and Sexual Activity    Alcohol use: No     Frequency: Never    Drug use: No    Sexual activity: Not on file   Lifestyle    Physical activity:     Days per week: Not on file     Minutes per session: Not on file    Stress: Not on file   Relationships    Social connections:     Talks on phone: Not on file     Gets together: Not on file     Attends Pentecostal service: Not on file     Active member of club or organization: Not on file     Attends meetings of clubs or organizations: Not on file     Relationship status: Not on file   Other Topics Concern  "   Not on file   Social History Narrative    Not on file     Review of patient's allergies indicates:   Allergen Reactions    Procardia [nifedipine] Nausea And Vomiting     Burning for the inside out       Review of Systems   Constitutional: Negative for activity change, appetite change, chills, fatigue and fever.   HENT: Negative for congestion, ear pain, postnasal drip, rhinorrhea, sinus pressure, sinus pain and sore throat.    Eyes: Negative for pain and redness.   Respiratory: Negative for cough, chest tightness and shortness of breath.    Cardiovascular: Negative for chest pain and leg swelling.   Gastrointestinal: Negative for abdominal distention, abdominal pain, constipation, diarrhea, nausea and vomiting.   Endocrine: Negative for cold intolerance and heat intolerance.   Genitourinary: Negative for dysuria, frequency and hematuria.   Musculoskeletal: Negative for arthralgias, back pain and joint swelling.   Skin: Negative for pallor.   Neurological: Negative for dizziness and light-headedness.   Psychiatric/Behavioral: Negative for agitation, decreased concentration and hallucinations. The patient is not nervous/anxious.        Objective:       BP (!) 148/94 (BP Location: Left arm, Patient Position: Sitting, BP Method: Large (Manual))   Pulse 70   Temp 98.5 °F (36.9 °C) (Oral)   Resp 18   Ht 5' 6" (1.676 m)   Wt 86.6 kg (191 lb)   SpO2 98%   BMI 30.83 kg/m²   Physical Exam   Constitutional: She is oriented to person, place, and time. She appears well-developed and well-nourished.   HENT:   Head: Normocephalic and atraumatic.   Nose: Nose normal.   Eyes: Pupils are equal, round, and reactive to light. Conjunctivae and EOM are normal.   Neck: Normal range of motion. Neck supple.   Cardiovascular: Normal rate, regular rhythm and normal heart sounds.   Pulmonary/Chest: Effort normal and breath sounds normal. Right breast exhibits no inverted nipple, no mass, no nipple discharge, no skin change and no " tenderness. Left breast exhibits no inverted nipple, no mass, no nipple discharge, no skin change and no tenderness. No breast swelling, tenderness, discharge or bleeding. Breasts are symmetrical.   Abdominal: Soft. Hernia confirmed negative in the right inguinal area and confirmed negative in the left inguinal area.   Genitourinary: Vagina normal and uterus normal. Pelvic exam was performed with patient supine. There is no rash, tenderness, lesion or injury on the right labia. There is no rash, tenderness, lesion or injury on the left labia. Cervix exhibits friability. Cervix exhibits no motion tenderness and no discharge. Right adnexum displays no mass, no tenderness and no fullness. Left adnexum displays no mass, no tenderness and no fullness.   Musculoskeletal: Normal range of motion.   Lymphadenopathy: No inguinal adenopathy noted on the right or left side.   Neurological: She is alert and oriented to person, place, and time.   Skin: Skin is warm and dry.   Psychiatric: She has a normal mood and affect. Her behavior is normal. Thought content normal.       Assessment:     1. Screening for malignant neoplasm of cervix    2. Screening examination for STD (sexually transmitted disease)    3. High risk heterosexual behavior    4. Hypertension, unspecified type    5. Hyperlipidemia, unspecified hyperlipidemia type    6. Breast lump in lower inner quadrant        Plan:   Screening for malignant neoplasm of cervix  -     Liquid-based pap smear, screening    Screening examination for STD (sexually transmitted disease)    High risk heterosexual behavior  -     RPR; Future; Expected date: 02/07/2020  -     HIV 1/2 Ag/Ab (4th Gen); Future; Expected date: 02/07/2020  -     Hepatitis panel, acute; Future; Expected date: 02/07/2020    Hypertension, unspecified type    Hyperlipidemia, unspecified hyperlipidemia type    Breast lump in lower inner quadrant      Discussed genetic screening.  Patient will consider.  Advised where  she can go for screening if desired.    Monitor blood pressure.  If blood pressure remains elevated can increase metoprolol.    Continue current meds.        Medication List with Changes/Refills   Current Medications    HYDROCHLOROTHIAZIDE (HYDRODIURIL) 25 MG TABLET    Take 1 tablet (25 mg total) by mouth once daily.    KETOROLAC (TORADOL) 10 MG TABLET    Take 1 tablet (10 mg total) by mouth every 6 (six) hours as needed for Pain.    METOPROLOL SUCCINATE (TOPROL-XL) 50 MG 24 HR TABLET    Take 1 tablet (50 mg total) by mouth once daily.    NUVARING 0.12-0.015 MG/24 HR VAGINAL RING    One ring vaginally and leave in place for 3 consecutive weeks, then remove for 1 week. Insert new ring 7 days after removal.    ONDANSETRON (ZOFRAN-ODT) 8 MG TBDL    Take 8 mg by mouth as needed.    VALACYCLOVIR (VALTREX) 1000 MG TABLET    TK 2 TS PO Q 12 H            Disclaimer: This note may have been prepared using voice recognition software, it may have not been extensively proofed, as such there could be errors within the text such as sound alike errors.

## 2020-02-08 LAB
HBV CORE IGM SERPL QL IA: NONREACTIVE
HBV SURFACE AG SERPL QL IA: NONREACTIVE
HCV IGG SERPL QL IA: NONREACTIVE
HEPATITIS A IGM: NONREACTIVE
HIV 1+2 AB+HIV1 P24 AG SERPL QL IA: NONREACTIVE
SYPHILIS TREPONEMAL ANTIBODY: NONREACTIVE

## 2020-02-11 LAB
CHLAMYDIA: NEGATIVE
GONORRHEA: NEGATIVE
SOURCE: NORMAL
SOURCE: NORMAL
TRICHOMONAS AMPLIFIED: NEGATIVE

## 2020-03-24 ENCOUNTER — OFFICE VISIT (OUTPATIENT)
Dept: FAMILY MEDICINE | Facility: CLINIC | Age: 40
End: 2020-03-24
Payer: MEDICAID

## 2020-03-24 VITALS
RESPIRATION RATE: 18 BRPM | HEIGHT: 66 IN | DIASTOLIC BLOOD PRESSURE: 78 MMHG | TEMPERATURE: 99 F | BODY MASS INDEX: 30.7 KG/M2 | SYSTOLIC BLOOD PRESSURE: 128 MMHG | OXYGEN SATURATION: 99 % | WEIGHT: 191 LBS | HEART RATE: 105 BPM

## 2020-03-24 DIAGNOSIS — J32.9 SINUSITIS, UNSPECIFIED CHRONICITY, UNSPECIFIED LOCATION: Primary | ICD-10-CM

## 2020-03-24 PROCEDURE — 99214 PR OFFICE/OUTPT VISIT, EST, LEVL IV, 30-39 MIN: ICD-10-PCS | Mod: S$GLB,,, | Performed by: FAMILY MEDICINE

## 2020-03-24 PROCEDURE — 99214 OFFICE O/P EST MOD 30 MIN: CPT | Mod: S$GLB,,, | Performed by: FAMILY MEDICINE

## 2020-03-24 RX ORDER — CODEINE PHOSPHATE AND GUAIFENESIN 10; 100 MG/5ML; MG/5ML
10 SOLUTION ORAL EVERY 6 HOURS PRN
Qty: 120 ML | Refills: 0 | Status: SHIPPED | OUTPATIENT
Start: 2020-03-24 | End: 2020-04-03

## 2020-03-24 RX ORDER — OSELTAMIVIR PHOSPHATE 75 MG/1
75 CAPSULE ORAL 2 TIMES DAILY
Qty: 10 CAPSULE | Refills: 0 | Status: SHIPPED | OUTPATIENT
Start: 2020-03-24 | End: 2020-03-29

## 2020-03-24 NOTE — PROGRESS NOTES
Subjective:      Patient ID: Breanna Dawson is a 39 y.o. female.    Chief Complaint: Cough (daughter diagnosed with influenza B saturday/melanies symptoms started today); Nasal Congestion; Sore Throat; Generalized Body Aches; and Fever (100 was the highest today)      HPI:  39-year-old white female past history of hypertension who presents with cough congestion.  T-max 100.4°.  Noticed this morning.  Start with some symptoms yesterday.  Runny nose sore throat.  Aches all over today.  Daughter was diagnosed with influenza B on 03/21/2020.  Daughter's T-max was 104.2°.  Patient has some shortness of breath only with exertion.  She does have coughing spells.  No c corona virus exposure    Past Medical History:   Diagnosis Date    Hypertension      Past Surgical History:   Procedure Laterality Date    CHOLECYSTECTOMY       Family History   Problem Relation Age of Onset    Hypertension Mother     Hypertension Brother      Social History     Socioeconomic History    Marital status:      Spouse name: Not on file    Number of children: Not on file    Years of education: Not on file    Highest education level: Not on file   Occupational History    Not on file   Social Needs    Financial resource strain: Not on file    Food insecurity:     Worry: Not on file     Inability: Not on file    Transportation needs:     Medical: Not on file     Non-medical: Not on file   Tobacco Use    Smoking status: Current Some Day Smoker     Types: Cigarettes   Substance and Sexual Activity    Alcohol use: No     Frequency: Never    Drug use: No    Sexual activity: Not on file   Lifestyle    Physical activity:     Days per week: Not on file     Minutes per session: Not on file    Stress: Not on file   Relationships    Social connections:     Talks on phone: Not on file     Gets together: Not on file     Attends Yazdan[employer] service: Not on file     Active member of club or organization: Not on file     Attends meetings  "of clubs or organizations: Not on file     Relationship status: Not on file   Other Topics Concern    Not on file   Social History Narrative    Not on file     Review of patient's allergies indicates:   Allergen Reactions    Procardia [nifedipine] Nausea And Vomiting     Burning for the inside out       Review of Systems   Constitutional: Positive for fatigue and fever. Negative for activity change and appetite change.   HENT: Positive for congestion, ear pain, sinus pressure, sinus pain, sneezing and sore throat.    Eyes: Negative for discharge, redness and itching.   Respiratory: Positive for cough. Negative for chest tightness, shortness of breath and wheezing.    Cardiovascular: Negative for chest pain and leg swelling.   Gastrointestinal: Negative for abdominal pain, constipation, diarrhea and nausea.   Endocrine: Negative for cold intolerance and heat intolerance.   Genitourinary: Negative for difficulty urinating, dysuria, frequency and urgency.   Musculoskeletal: Positive for myalgias. Negative for back pain and joint swelling.   Skin: Negative for rash.   Neurological: Negative for dizziness and numbness.   Psychiatric/Behavioral: Negative for agitation and behavioral problems.       Objective:       /78 (BP Location: Left arm, Patient Position: Sitting, BP Method: Medium (Manual))   Pulse 105   Temp 99 °F (37.2 °C) (Oral)   Resp 18   Ht 5' 6" (1.676 m)   Wt 86.6 kg (191 lb)   SpO2 99%   BMI 30.83 kg/m²   Physical Exam   Constitutional: She is oriented to person, place, and time. She appears well-developed and well-nourished.   HENT:   Head: Normocephalic and atraumatic.   Right Ear: Hearing normal. Tympanic membrane is injected. Tympanic membrane is not perforated. A middle ear effusion is present.   Left Ear: Hearing normal. Tympanic membrane is injected. Tympanic membrane is not perforated. A middle ear effusion is present.   Nose: Rhinorrhea present. Right sinus exhibits maxillary sinus " tenderness and frontal sinus tenderness. Left sinus exhibits maxillary sinus tenderness and frontal sinus tenderness.   Mouth/Throat: Posterior oropharyngeal erythema present. No posterior oropharyngeal edema or tonsillar abscesses.   Eyes: Pupils are equal, round, and reactive to light. Conjunctivae and EOM are normal.   Neck: Normal range of motion. Neck supple.   Cardiovascular: Normal rate, regular rhythm and normal heart sounds.   Pulmonary/Chest: Effort normal and breath sounds normal.   Abdominal: Soft.   Musculoskeletal: Normal range of motion.   Neurological: She is alert and oriented to person, place, and time.   Skin: Skin is warm and dry.   Psychiatric: She has a normal mood and affect. Her behavior is normal. Thought content normal.       Assessment:     1. Sinusitis, unspecified chronicity, unspecified location        Plan:   Sinusitis, unspecified chronicity, unspecified location  -     oseltamivir (TAMIFLU) 75 MG capsule; Take 1 capsule (75 mg total) by mouth 2 (two) times daily. for 5 days  Dispense: 10 capsule; Refill: 0  -     POCT Influenza A/B  -     guaifenesin-codeine 100-10 mg/5 ml (CHERATUSSIN AC)  mg/5 mL syrup; Take 10 mLs by mouth every 6 (six) hours as needed.  Dispense: 120 mL; Refill: 0      Flu swab negative but given close contact with flu B 3 days ago highly likely patient has influenza.    Start Tamiflu.    Cheratussin as needed.    [place]  reviewed.    Tylenol for fever.  Avoid ibuprofen at this time.    If symptoms worsen or failure improved patient will return to clinic.    No work until next Wednesday.  The patient still has symptoms at that time will reassess.  Advise that she needs to be afebrile without medication and complete resolution of symptoms prior to going to work.    Home quarantine for the time being    Medication List with Changes/Refills   New Medications    GUAIFENESIN-CODEINE 100-10 MG/5 ML (CHERATUSSIN AC)  MG/5 ML SYRUP    Take 10 mLs by mouth  every 6 (six) hours as needed.    OSELTAMIVIR (TAMIFLU) 75 MG CAPSULE    Take 1 capsule (75 mg total) by mouth 2 (two) times daily. for 5 days   Current Medications    HYDROCHLOROTHIAZIDE (HYDRODIURIL) 25 MG TABLET    Take 1 tablet (25 mg total) by mouth once daily.    KETOROLAC (TORADOL) 10 MG TABLET    Take 1 tablet (10 mg total) by mouth every 6 (six) hours as needed for Pain.    METOPROLOL SUCCINATE (TOPROL-XL) 50 MG 24 HR TABLET    Take 1 tablet (50 mg total) by mouth once daily.    NUVARING 0.12-0.015 MG/24 HR VAGINAL RING    One ring vaginally and leave in place for 3 consecutive weeks, then remove for 1 week. Insert new ring 7 days after removal.    ONDANSETRON (ZOFRAN-ODT) 8 MG TBDL    Take 8 mg by mouth as needed.    VALACYCLOVIR (VALTREX) 1000 MG TABLET    TK 2 TS PO Q 12 H            Disclaimer: This note may have been prepared using voice recognition software, it may have not been extensively proofed, as such there could be errors within the text such as sound alike errors.

## 2020-05-20 ENCOUNTER — TELEPHONE (OUTPATIENT)
Dept: FAMILY MEDICINE | Facility: CLINIC | Age: 40
End: 2020-05-20

## 2020-05-20 ENCOUNTER — OFFICE VISIT (OUTPATIENT)
Dept: FAMILY MEDICINE | Facility: CLINIC | Age: 40
End: 2020-05-20
Payer: MEDICAID

## 2020-05-20 VITALS
WEIGHT: 190 LBS | TEMPERATURE: 98 F | BODY MASS INDEX: 30.53 KG/M2 | DIASTOLIC BLOOD PRESSURE: 84 MMHG | OXYGEN SATURATION: 98 % | HEART RATE: 88 BPM | RESPIRATION RATE: 18 BRPM | SYSTOLIC BLOOD PRESSURE: 148 MMHG | HEIGHT: 66 IN

## 2020-05-20 DIAGNOSIS — M62.830 LUMBAR PARASPINAL MUSCLE SPASM: ICD-10-CM

## 2020-05-20 DIAGNOSIS — S39.012A LUMBAR STRAIN, INITIAL ENCOUNTER: Primary | ICD-10-CM

## 2020-05-20 DIAGNOSIS — M54.50 ACUTE RIGHT-SIDED LOW BACK PAIN, UNSPECIFIED WHETHER SCIATICA PRESENT: ICD-10-CM

## 2020-05-20 DIAGNOSIS — B00.1 RECURRENT HERPES LABIALIS: ICD-10-CM

## 2020-05-20 DIAGNOSIS — I10 HYPERTENSION, UNSPECIFIED TYPE: ICD-10-CM

## 2020-05-20 PROCEDURE — 99214 OFFICE O/P EST MOD 30 MIN: CPT | Mod: 25,S$GLB,, | Performed by: STUDENT IN AN ORGANIZED HEALTH CARE EDUCATION/TRAINING PROGRAM

## 2020-05-20 PROCEDURE — 99214 PR OFFICE/OUTPT VISIT, EST, LEVL IV, 30-39 MIN: ICD-10-PCS | Mod: 25,S$GLB,, | Performed by: STUDENT IN AN ORGANIZED HEALTH CARE EDUCATION/TRAINING PROGRAM

## 2020-05-20 RX ORDER — TIZANIDINE 4 MG/1
4 TABLET ORAL EVERY 8 HOURS PRN
Qty: 30 TABLET | Refills: 0 | Status: SHIPPED | OUTPATIENT
Start: 2020-05-20 | End: 2020-05-30

## 2020-05-20 RX ORDER — VALACYCLOVIR HYDROCHLORIDE 1 G/1
TABLET, FILM COATED ORAL
Qty: 4 TABLET | Refills: 1 | Status: SHIPPED | OUTPATIENT
Start: 2020-05-20 | End: 2020-06-24 | Stop reason: SDUPTHER

## 2020-05-20 RX ORDER — METOPROLOL SUCCINATE 100 MG/1
100 TABLET, EXTENDED RELEASE ORAL DAILY
Qty: 30 TABLET | Refills: 1 | Status: SHIPPED | OUTPATIENT
Start: 2020-05-20 | End: 2020-08-10 | Stop reason: SDUPTHER

## 2020-05-20 RX ORDER — DICLOFENAC SODIUM 50 MG/1
50 TABLET, DELAYED RELEASE ORAL 2 TIMES DAILY PRN
Qty: 20 TABLET | Refills: 0 | Status: SHIPPED | OUTPATIENT
Start: 2020-05-20 | End: 2020-05-30

## 2020-05-20 RX ORDER — KETOROLAC TROMETHAMINE 30 MG/ML
60 INJECTION, SOLUTION INTRAMUSCULAR; INTRAVENOUS
Status: COMPLETED | OUTPATIENT
Start: 2020-05-20 | End: 2020-05-21

## 2020-05-20 NOTE — PROGRESS NOTES
"Subjective:      Patient ID: Breanna Dawson is a 39 y.o. female.    Chief Complaint: Low-back Pain ("over did it" while deep cleaning/got adjusted friday and saturday/sunday couldnt move and went to urgent care/given steroid and muscle relaxers/still having back spasms )      HPI: 40 yo female with PMH of HTN presents today for back pain. Symptoms began on Friday. States she did some deep cleaning the day before and thought she was just sore. States she got adjustments from her chiropractor on Friday and Saturday. Denies any relief. States the pain got worse. Pt had an urgent care visit. States they gave her a steroid shot, prednisone, and mobic. Denies relief with current medications. Pt currently taking HCTZ and metoprolol for HTN. Reports at home BP readings around 130-140/80.     Past Medical History:   Diagnosis Date    Hypertension      Past Surgical History:   Procedure Laterality Date    CHOLECYSTECTOMY       Family History   Problem Relation Age of Onset    Hypertension Mother     Hypertension Brother      Social History     Socioeconomic History    Marital status:      Spouse name: Not on file    Number of children: Not on file    Years of education: Not on file    Highest education level: Not on file   Occupational History    Not on file   Social Needs    Financial resource strain: Somewhat hard    Food insecurity:     Worry: Never true     Inability: Never true    Transportation needs:     Medical: No     Non-medical: No   Tobacco Use    Smoking status: Current Some Day Smoker     Types: Cigarettes   Substance and Sexual Activity    Alcohol use: No     Frequency: 2-4 times a month     Drinks per session: 1 or 2     Binge frequency: Less than monthly    Drug use: No    Sexual activity: Not on file   Lifestyle    Physical activity:     Days per week: 3 days     Minutes per session: 30 min    Stress: Not at all   Relationships    Social connections:     Talks on phone: More than " "three times a week     Gets together: Three times a week     Attends [employer] service: Not on file     Active member of club or organization: Yes     Attends meetings of clubs or organizations: More than 4 times per year     Relationship status:    Other Topics Concern    Not on file   Social History Narrative    Not on file     Review of patient's allergies indicates:   Allergen Reactions    Procardia [nifedipine] Nausea And Vomiting     Burning for the inside out       Review of Systems   Constitutional: Negative for activity change, appetite change, fatigue and unexpected weight change.   HENT: Negative for sinus pain.    Respiratory: Negative for cough and shortness of breath.    Cardiovascular: Negative for chest pain.   Gastrointestinal: Negative for abdominal pain, nausea and vomiting.   Genitourinary: Negative for difficulty urinating, dysuria, frequency and urgency.   Musculoskeletal: Positive for back pain and myalgias. Negative for arthralgias.   Neurological: Negative for dizziness and headaches.   Psychiatric/Behavioral: The patient is not nervous/anxious.        Objective:       BP (!) 148/84 (BP Location: Left arm, Patient Position: Sitting, BP Method: Medium (Manual))   Pulse 88   Temp 98.2 °F (36.8 °C) (Oral)   Resp 18   Ht 5' 6" (1.676 m)   Wt 86.2 kg (190 lb)   SpO2 98%   BMI 30.67 kg/m²   Physical Exam   Constitutional: She is oriented to person, place, and time. She appears well-developed and well-nourished.   Patient appears uncomfortable   HENT:   Head: Normocephalic and atraumatic.   Eyes: Conjunctivae and EOM are normal.   Neck: Normal range of motion. Neck supple.   Cardiovascular: Normal rate and regular rhythm.   Pulmonary/Chest: Effort normal.   Abdominal: Soft.   Musculoskeletal: Normal range of motion. She exhibits tenderness.        Lumbar back: She exhibits tenderness and spasm.        Back:         Arms:  Neurological: She is alert and oriented to person, place, " and time.   Skin: Skin is warm and dry.   Psychiatric: She has a normal mood and affect.   Nursing note and vitals reviewed.      Assessment:     1. Lumbar strain, initial encounter    2. Lumbar paraspinal muscle spasm    3. Acute right-sided low back pain, unspecified whether sciatica present    4. Hypertension, unspecified type    5. Recurrent herpes labialis        Plan:   Lumbar strain, initial encounter  -     ketorolac injection 60 mg  -     tiZANidine (ZANAFLEX) 4 MG tablet; Take 1 tablet (4 mg total) by mouth every 8 (eight) hours as needed.  Dispense: 30 tablet; Refill: 0  -     diclofenac (VOLTAREN) 50 MG EC tablet; Take 1 tablet (50 mg total) by mouth 2 (two) times daily as needed.  Dispense: 20 tablet; Refill: 0    Lumbar paraspinal muscle spasm  -     tiZANidine (ZANAFLEX) 4 MG tablet; Take 1 tablet (4 mg total) by mouth every 8 (eight) hours as needed.  Dispense: 30 tablet; Refill: 0    Acute right-sided low back pain, unspecified whether sciatica present  -     X-Ray Lumbar Spine 2 Or 3 Views; Future; Expected date: 05/20/2020    Hypertension, unspecified type  -     metoprolol succinate (TOPROL-XL) 100 MG 24 hr tablet; Take 1 tablet (100 mg total) by mouth once daily.  Dispense: 30 tablet; Refill: 1  -     CBC auto differential  -     Comprehensive metabolic panel  -     Lipid Panel    Recurrent herpes labialis  -     valACYclovir (VALTREX) 1000 MG tablet; TK 2 TS PO Q 12 H  Dispense: 4 tablet; Refill: 1      Ketorolac injection given in clinic. Patient tolerated well.     Start Tizanidine. Stop Mobic.     Continue Prednisone. May start diclofenac after completing prednisone if needed.     I have independently reviewed Lumbar xray ordered and obtained today. Findings: No fracture noted.     Pt BP continues to be elevated. Increase Metoprolol to 100 mg daily.     Hypotension precautions given. Patient stated understanding.     Labs pending. Pt will come in fasting for blood work.     Contact clinic if  symptoms worsen or no improvement.   Medication List with Changes/Refills   New Medications    DICLOFENAC (VOLTAREN) 50 MG EC TABLET    Take 1 tablet (50 mg total) by mouth 2 (two) times daily as needed.    METOPROLOL SUCCINATE (TOPROL-XL) 100 MG 24 HR TABLET    Take 1 tablet (100 mg total) by mouth once daily.    TIZANIDINE (ZANAFLEX) 4 MG TABLET    Take 1 tablet (4 mg total) by mouth every 8 (eight) hours as needed.   Current Medications    HYDROCHLOROTHIAZIDE (HYDRODIURIL) 25 MG TABLET    Take 1 tablet (25 mg total) by mouth once daily.    KETOROLAC (TORADOL) 10 MG TABLET    Take 1 tablet (10 mg total) by mouth every 6 (six) hours as needed for Pain.    NUVARING 0.12-0.015 MG/24 HR VAGINAL RING    One ring vaginally and leave in place for 3 consecutive weeks, then remove for 1 week. Insert new ring 7 days after removal.    ONDANSETRON (ZOFRAN-ODT) 8 MG TBDL    Take 8 mg by mouth as needed.   Changed and/or Refilled Medications    Modified Medication Previous Medication    VALACYCLOVIR (VALTREX) 1000 MG TABLET valACYclovir (VALTREX) 1000 MG tablet       TK 2 TS PO Q 12 H    TK 2 TS PO Q 12 H   Discontinued Medications    METOPROLOL SUCCINATE (TOPROL-XL) 50 MG 24 HR TABLET    Take 1 tablet (50 mg total) by mouth once daily.              Disclaimer: This note may have been prepared using voice recognition software, it may have not been extensively proofed, as such there could be errors within the text such as sound alike errors.

## 2020-05-21 RX ADMIN — KETOROLAC TROMETHAMINE 60 MG: 30 INJECTION, SOLUTION INTRAMUSCULAR; INTRAVENOUS at 03:05

## 2020-05-25 ENCOUNTER — TELEPHONE (OUTPATIENT)
Dept: FAMILY MEDICINE | Facility: CLINIC | Age: 40
End: 2020-05-25

## 2020-05-25 NOTE — TELEPHONE ENCOUNTER
----- Message from Nusrat Bhakta sent at 5/22/2020  9:59 AM CDT -----  Kayli told her to call if her back wasn't better by today & it's not.

## 2020-06-22 ENCOUNTER — CLINICAL SUPPORT (OUTPATIENT)
Dept: FAMILY MEDICINE | Facility: CLINIC | Age: 40
End: 2020-06-22
Payer: MEDICAID

## 2020-06-23 LAB
ABS NRBC COUNT: 0 X 10 3/UL (ref 0–0.01)
ABSOLUTE BASOPHIL: 0.04 X 10 3/UL (ref 0–0.22)
ABSOLUTE EOSINOPHIL: 0.11 X 10 3/UL (ref 0.04–0.54)
ABSOLUTE IMMATURE GRAN: 0.04 X 10 3/UL (ref 0–0.04)
ABSOLUTE LYMPHOCYTE: 2.2 X 10 3/UL (ref 0.86–4.75)
ABSOLUTE MONOCYTE: 0.41 X 10 3/UL (ref 0.22–1.08)
ALBUMIN SERPL-MCNC: 4.3 G/DL (ref 3.5–5.2)
ALBUMIN/GLOB SERPL ELPH: 1.9 {RATIO} (ref 1–2.7)
ALP ISOS SERPL LEV INH-CCNC: 62 U/L (ref 35–105)
ALT (SGPT): 13 U/L (ref 0–33)
ANION GAP SERPL CALC-SCNC: 9 MMOL/L (ref 8–17)
AST SERPL-CCNC: 13 U/L (ref 0–32)
BASOPHILS NFR BLD: 0.5 % (ref 0.2–1.2)
BILIRUBIN, TOTAL: 0.18 MG/DL (ref 0–1.2)
BUN/CREAT SERPL: 17 (ref 6–20)
CALCIUM SERPL-MCNC: 8.9 MG/DL (ref 8.6–10.2)
CARBON DIOXIDE, CO2: 25 MMOL/L (ref 22–29)
CHLORIDE: 107 MMOL/L (ref 98–107)
CHOLEST SERPL-MSCNC: 237 MG/DL (ref 100–200)
CREAT SERPL-MCNC: 0.86 MG/DL (ref 0.5–0.9)
EOSINOPHIL NFR BLD: 1.4 % (ref 0.7–7)
GFR ESTIMATION: 73.85
GLOBULIN: 2.3 G/DL (ref 1.5–4.5)
GLUCOSE: 92 MG/DL (ref 74–106)
HCT VFR BLD AUTO: 38.5 % (ref 37–47)
HDLC SERPL-MCNC: 72 MG/DL
HGB BLD-MCNC: 12.9 G/DL (ref 12–16)
IMMATURE GRANULOCYTES: 0.5 % (ref 0–0.5)
LDL/HDL RATIO: 1.8 (ref 1–3)
LDLC SERPL CALC-MCNC: 129 MG/DL (ref 0–100)
LYMPHOCYTES NFR BLD: 27.2 % (ref 19.3–53.1)
MCH RBC QN AUTO: 28.9 PG (ref 27–32)
MCHC RBC AUTO-ENTMCNC: 33.5 G/DL (ref 32–36)
MCV RBC AUTO: 86.3 FL (ref 82–100)
MONOCYTES NFR BLD: 5.1 % (ref 4.7–12.5)
NEUTROPHILS ABSOLUTE COUNT: 5.29 X 10 3/UL (ref 2.15–7.56)
NEUTROPHILS NFR BLD: 65.3 %
NUCLEATED RED BLOOD CELLS: 0 /100 WBC (ref 0–0.2)
PLATELET # BLD AUTO: 235 X 10 3/UL (ref 135–400)
POTASSIUM: 4.8 MMOL/L (ref 3.5–5.1)
PROT SNV-MCNC: 6.6 G/DL (ref 6.4–8.3)
RBC # BLD AUTO: 4.46 X 10 6/UL (ref 4.2–5.4)
RDW-SD: 39.5 FL (ref 37–54)
SODIUM: 141 MMOL/L (ref 136–145)
TRIGL SERPL-MCNC: 180 MG/DL (ref 0–150)
UREA NITROGEN (BUN): 14.6 MG/DL (ref 6–20)
WBC # BLD: 8.09 X 10 3/UL (ref 4.3–10.8)

## 2020-06-24 DIAGNOSIS — B00.1 RECURRENT HERPES LABIALIS: ICD-10-CM

## 2020-06-24 RX ORDER — VALACYCLOVIR HYDROCHLORIDE 1 G/1
TABLET, FILM COATED ORAL
Qty: 4 TABLET | Refills: 5 | Status: SHIPPED | OUTPATIENT
Start: 2020-06-24 | End: 2021-07-08 | Stop reason: SDUPTHER

## 2020-08-10 DIAGNOSIS — I10 HYPERTENSION, UNSPECIFIED TYPE: ICD-10-CM

## 2020-08-10 RX ORDER — METOPROLOL SUCCINATE 100 MG/1
100 TABLET, EXTENDED RELEASE ORAL DAILY
Qty: 30 TABLET | Refills: 2 | Status: SHIPPED | OUTPATIENT
Start: 2020-08-10 | End: 2020-11-23 | Stop reason: SDUPTHER

## 2020-10-23 ENCOUNTER — OFFICE VISIT (OUTPATIENT)
Dept: FAMILY MEDICINE | Facility: CLINIC | Age: 40
End: 2020-10-23
Payer: MEDICAID

## 2020-10-23 VITALS
OXYGEN SATURATION: 98 % | HEART RATE: 76 BPM | WEIGHT: 202 LBS | RESPIRATION RATE: 18 BRPM | SYSTOLIC BLOOD PRESSURE: 140 MMHG | HEIGHT: 66 IN | DIASTOLIC BLOOD PRESSURE: 80 MMHG | TEMPERATURE: 99 F | BODY MASS INDEX: 32.47 KG/M2

## 2020-10-23 DIAGNOSIS — B35.3 TINEA PEDIS OF RIGHT FOOT: Primary | ICD-10-CM

## 2020-10-23 PROCEDURE — 99213 OFFICE O/P EST LOW 20 MIN: CPT | Mod: S$GLB,,, | Performed by: STUDENT IN AN ORGANIZED HEALTH CARE EDUCATION/TRAINING PROGRAM

## 2020-10-23 PROCEDURE — 99213 PR OFFICE/OUTPT VISIT, EST, LEVL III, 20-29 MIN: ICD-10-PCS | Mod: S$GLB,,, | Performed by: STUDENT IN AN ORGANIZED HEALTH CARE EDUCATION/TRAINING PROGRAM

## 2020-10-23 RX ORDER — CLOTRIMAZOLE 1 %
CREAM (GRAM) TOPICAL 2 TIMES DAILY
Qty: 12 G | Refills: 0 | Status: SHIPPED | OUTPATIENT
Start: 2020-10-23 | End: 2021-12-15

## 2020-10-23 NOTE — PROGRESS NOTES
Subjective:      Patient ID: Breanna Dawson is a 40 y.o. female.    Chief Complaint: Foot Pain (skin peeling on foot and hands/pain in right foot/antifungal and cortizone cream not working/no itching )      HPI:  40-year-old female with past medical history of hypertension presents today for a rash on her right foot.  Symptoms began 3-4 days ago.  Reports peeling around the right great toe.  Reports redness.  Denies itching. Patient states she has tried OTC steroid and antifungal cream with no improvement.  She has since noticed some peeling on both of her hands.  Denies itching.  Patient states BP is slightly elevated in clinic today.  States she has not taken her blood pressure medicine this morning.    Past Medical History:   Diagnosis Date    Hypertension      Past Surgical History:   Procedure Laterality Date    CHOLECYSTECTOMY       Family History   Problem Relation Age of Onset    Hypertension Mother     Hypertension Brother      Social History     Socioeconomic History    Marital status:      Spouse name: Not on file    Number of children: Not on file    Years of education: Not on file    Highest education level: Not on file   Occupational History    Not on file   Social Needs    Financial resource strain: Somewhat hard    Food insecurity     Worry: Never true     Inability: Never true    Transportation needs     Medical: No     Non-medical: No   Tobacco Use    Smoking status: Current Some Day Smoker     Types: Cigarettes   Substance and Sexual Activity    Alcohol use: No     Frequency: 2-4 times a month     Drinks per session: 1 or 2     Binge frequency: Less than monthly    Drug use: No    Sexual activity: Not on file   Lifestyle    Physical activity     Days per week: 3 days     Minutes per session: 30 min    Stress: Not at all   Relationships    Social connections     Talks on phone: More than three times a week     Gets together: Three times a week     Attends [employer]  "service: Not on file     Active member of club or organization: Yes     Attends meetings of clubs or organizations: More than 4 times per year     Relationship status:    Other Topics Concern    Not on file   Social History Narrative    Not on file     Review of patient's allergies indicates:   Allergen Reactions    Procardia [nifedipine] Nausea And Vomiting     Burning for the inside out       Review of Systems   Constitutional: Negative for activity change, appetite change, fatigue and unexpected weight change.   HENT: Negative for sinus pain.    Respiratory: Negative for cough and shortness of breath.    Cardiovascular: Negative for chest pain.   Gastrointestinal: Negative for abdominal pain, nausea and vomiting.   Genitourinary: Negative for difficulty urinating.   Musculoskeletal: Negative for arthralgias and myalgias.   Skin: Positive for rash.   Neurological: Negative for dizziness and headaches.   Psychiatric/Behavioral: The patient is not nervous/anxious.        Objective:       BP (!) 140/80 (BP Location: Left arm, Patient Position: Sitting, BP Method: Medium (Manual))   Pulse 76   Temp 98.6 °F (37 °C) (Oral)   Resp 18   Ht 5' 6" (1.676 m)   Wt 91.6 kg (202 lb)   SpO2 98%   BMI 32.60 kg/m²   Physical Exam  Constitutional:       Appearance: Normal appearance. She is well-developed.   HENT:      Head: Normocephalic and atraumatic.   Eyes:      Extraocular Movements: Extraocular movements intact.      Conjunctiva/sclera: Conjunctivae normal.      Pupils: Pupils are equal, round, and reactive to light.   Neck:      Musculoskeletal: Normal range of motion and neck supple.   Cardiovascular:      Rate and Rhythm: Normal rate and regular rhythm.   Pulmonary:      Effort: Pulmonary effort is normal.   Abdominal:      Palpations: Abdomen is soft.   Musculoskeletal: Normal range of motion.   Skin:     General: Skin is warm and dry.      Findings: Rash present.      Comments: Tinea pedis of right " great toe.   Neurological:      General: No focal deficit present.      Mental Status: She is alert and oriented to person, place, and time.   Psychiatric:         Mood and Affect: Mood normal.         Assessment:     1. Tinea pedis of right foot        Plan:   Tinea pedis of right foot  -     clotrimazole (LOTRIMIN) 1 % cream; Apply topically 2 (two) times daily. for 14 days  Dispense: 12 g; Refill: 0      Trial of clotrimazole x2 weeks.    RTC if symptoms worsen or no improvement.  Medication List with Changes/Refills   New Medications    CLOTRIMAZOLE (LOTRIMIN) 1 % CREAM    Apply topically 2 (two) times daily. for 14 days   Current Medications    HYDROCHLOROTHIAZIDE (HYDRODIURIL) 25 MG TABLET    Take 1 tablet (25 mg total) by mouth once daily.    KETOROLAC (TORADOL) 10 MG TABLET    Take 1 tablet (10 mg total) by mouth every 6 (six) hours as needed for Pain.    METOPROLOL SUCCINATE (TOPROL-XL) 100 MG 24 HR TABLET    Take 1 tablet (100 mg total) by mouth once daily.    NUVARING 0.12-0.015 MG/24 HR VAGINAL RING    One ring vaginally and leave in place for 3 consecutive weeks, then remove for 1 week. Insert new ring 7 days after removal.    ONDANSETRON (ZOFRAN-ODT) 8 MG TBDL    Take 8 mg by mouth as needed.    VALACYCLOVIR (VALTREX) 1000 MG TABLET    TK 2 TS PO Q 12 H              Disclaimer: This note may have been prepared using voice recognition software, it may have not been extensively proofed, as such there could be errors within the text such as sound alike errors.

## 2020-11-03 ENCOUNTER — PATIENT MESSAGE (OUTPATIENT)
Dept: FAMILY MEDICINE | Facility: CLINIC | Age: 40
End: 2020-11-03

## 2020-11-03 DIAGNOSIS — B35.3 TINEA PEDIS OF RIGHT FOOT: Primary | ICD-10-CM

## 2020-11-03 RX ORDER — TERBINAFINE HYDROCHLORIDE 250 MG/1
250 TABLET ORAL DAILY
Qty: 14 TABLET | Refills: 0 | Status: SHIPPED | OUTPATIENT
Start: 2020-11-03 | End: 2020-11-17

## 2020-11-04 ENCOUNTER — PATIENT MESSAGE (OUTPATIENT)
Dept: FAMILY MEDICINE | Facility: CLINIC | Age: 40
End: 2020-11-04

## 2020-11-04 ENCOUNTER — TELEPHONE (OUTPATIENT)
Dept: FAMILY MEDICINE | Facility: CLINIC | Age: 40
End: 2020-11-04

## 2020-11-04 NOTE — TELEPHONE ENCOUNTER
Message patient back informed of ELSIE [place] orders to try over the counter allergy medication ( zyrtec or claritin) if not improvement she will have to make appointment.

## 2020-11-04 NOTE — TELEPHONE ENCOUNTER
----- Message from Kayli Lira PA-C sent at 11/4/2020 10:05 AM CST -----  She could try OTC allergy med (zyrtec, claritin) to see if it improved. If no improvement she would need an appt.   ----- Message -----  From: Viv Muñoz LPN  Sent: 11/4/2020   9:10 AM CST  To: Kayli Lira PA-C    Patient sent a message asking for eye drops states that her eye is itching,  no pink, redness or drainage. She is able to open the eye. Do she need appointment?

## 2020-11-20 ENCOUNTER — PATIENT MESSAGE (OUTPATIENT)
Dept: FAMILY MEDICINE | Facility: CLINIC | Age: 40
End: 2020-11-20

## 2020-11-20 ENCOUNTER — OFFICE VISIT (OUTPATIENT)
Dept: FAMILY MEDICINE | Facility: CLINIC | Age: 40
End: 2020-11-20
Payer: MEDICAID

## 2020-11-20 VITALS
SYSTOLIC BLOOD PRESSURE: 112 MMHG | HEART RATE: 80 BPM | RESPIRATION RATE: 18 BRPM | BODY MASS INDEX: 32.78 KG/M2 | HEIGHT: 66 IN | DIASTOLIC BLOOD PRESSURE: 76 MMHG | WEIGHT: 204 LBS | TEMPERATURE: 99 F | OXYGEN SATURATION: 98 %

## 2020-11-20 DIAGNOSIS — E78.5 HYPERLIPIDEMIA, UNSPECIFIED HYPERLIPIDEMIA TYPE: ICD-10-CM

## 2020-11-20 DIAGNOSIS — R73.9 HYPERGLYCEMIA: ICD-10-CM

## 2020-11-20 DIAGNOSIS — I10 HYPERTENSION, UNSPECIFIED TYPE: Primary | ICD-10-CM

## 2020-11-20 DIAGNOSIS — L30.1 DYSHIDROTIC ECZEMA: ICD-10-CM

## 2020-11-20 DIAGNOSIS — D64.9 ANEMIA, UNSPECIFIED TYPE: ICD-10-CM

## 2020-11-20 DIAGNOSIS — R21 RASH OF BOTH HANDS: ICD-10-CM

## 2020-11-20 PROCEDURE — 82962 POCT GLUCOSE, HAND-HELD DEVICE: ICD-10-PCS | Mod: ,,, | Performed by: FAMILY MEDICINE

## 2020-11-20 PROCEDURE — 82962 GLUCOSE BLOOD TEST: CPT | Mod: ,,, | Performed by: FAMILY MEDICINE

## 2020-11-20 PROCEDURE — 99214 PR OFFICE/OUTPT VISIT, EST, LEVL IV, 30-39 MIN: ICD-10-PCS | Mod: S$GLB,,, | Performed by: FAMILY MEDICINE

## 2020-11-20 PROCEDURE — 99214 OFFICE O/P EST MOD 30 MIN: CPT | Mod: S$GLB,,, | Performed by: FAMILY MEDICINE

## 2020-11-20 RX ORDER — BETAMETHASONE VALERATE 1.2 MG/G
CREAM TOPICAL 2 TIMES DAILY
Qty: 45 G | Refills: 3 | Status: SHIPPED | OUTPATIENT
Start: 2020-11-20 | End: 2021-02-01

## 2020-11-20 RX ORDER — PREDNISONE 20 MG/1
TABLET ORAL
Qty: 15 TABLET | Refills: 0 | Status: SHIPPED | OUTPATIENT
Start: 2020-11-20 | End: 2020-12-11

## 2020-11-20 NOTE — PROGRESS NOTES
Subjective:      Patient ID: Breanna Dawson is a 40 y.o. female.    Chief Complaint: Follow-up      HPI:  40-year-old white female past history of hypertension who presents for rash in fatigue.  Started feeling bad 1-2 months ago.  She denies urinary frequency.  She does get thirsty but does not drink a lot.  Blood pressure has been running well.  No fever.  No cough.  Rash on her feet seems to be getting worse.  She has tried multiple creams and prescribed meds with no improvement.  She now notice a rash on her hands.  She denies any tick exposure.    Past Medical History:   Diagnosis Date    Hypertension      Past Surgical History:   Procedure Laterality Date    CHOLECYSTECTOMY       Family History   Problem Relation Age of Onset    Hypertension Mother     Hypertension Brother      Social History     Socioeconomic History    Marital status:      Spouse name: Not on file    Number of children: Not on file    Years of education: Not on file    Highest education level: Not on file   Occupational History    Not on file   Social Needs    Financial resource strain: Somewhat hard    Food insecurity     Worry: Never true     Inability: Never true    Transportation needs     Medical: No     Non-medical: No   Tobacco Use    Smoking status: Current Some Day Smoker     Types: Cigarettes   Substance and Sexual Activity    Alcohol use: No     Frequency: 2-4 times a month     Drinks per session: 1 or 2     Binge frequency: Less than monthly    Drug use: No    Sexual activity: Not on file   Lifestyle    Physical activity     Days per week: 3 days     Minutes per session: 30 min    Stress: Not at all   Relationships    Social connections     Talks on phone: More than three times a week     Gets together: Three times a week     Attends Anabaptist service: Not on file     Active member of club or organization: Yes     Attends meetings of clubs or organizations: More than 4 times per year     Relationship  "status:    Other Topics Concern    Not on file   Social History Narrative    Not on file     Review of patient's allergies indicates:   Allergen Reactions    Procardia [nifedipine] Nausea And Vomiting     Burning for the inside out       Review of Systems   Constitutional: Positive for fatigue. Negative for activity change, appetite change, chills and fever.   HENT: Negative for congestion, ear pain, postnasal drip, rhinorrhea, sinus pressure, sinus pain and sore throat.    Eyes: Negative for pain and redness.   Respiratory: Negative for cough, chest tightness and shortness of breath.    Cardiovascular: Negative for chest pain and leg swelling.   Gastrointestinal: Negative for abdominal distention, abdominal pain, constipation, diarrhea, nausea and vomiting.   Endocrine: Negative for cold intolerance and heat intolerance.   Genitourinary: Negative for dysuria, frequency and hematuria.   Musculoskeletal: Negative for arthralgias, back pain and joint swelling.   Skin: Positive for rash. Negative for pallor.   Neurological: Negative for dizziness and light-headedness.   Psychiatric/Behavioral: Negative for agitation, decreased concentration and hallucinations. The patient is not nervous/anxious.        Objective:       /76 (BP Location: Left arm, Patient Position: Sitting, BP Method: Large (Manual))   Pulse 80   Temp 98.6 °F (37 °C) (Oral)   Resp 18   Ht 5' 6" (1.676 m)   Wt 92.5 kg (204 lb)   SpO2 98%   BMI 32.93 kg/m²   Physical Exam  Constitutional:       Appearance: She is well-developed.   HENT:      Head: Normocephalic and atraumatic.      Nose: Nose normal.   Eyes:      Conjunctiva/sclera: Conjunctivae normal.      Pupils: Pupils are equal, round, and reactive to light.   Neck:      Musculoskeletal: Normal range of motion and neck supple.   Cardiovascular:      Rate and Rhythm: Normal rate and regular rhythm.      Heart sounds: Normal heart sounds.   Pulmonary:      Effort: Pulmonary " effort is normal.      Breath sounds: Normal breath sounds.   Abdominal:      Palpations: Abdomen is soft.   Musculoskeletal: Normal range of motion.   Skin:     General: Skin is warm and dry.      Comments: Dry cracked irritated skin noted over bilateral feet, right worse than left, dry cracked skin noted of bilateral hands.   Neurological:      Mental Status: She is alert and oriented to person, place, and time.   Psychiatric:         Behavior: Behavior normal.         Thought Content: Thought content normal.         Assessment:     1. Hypertension, unspecified type    2. Hyperlipidemia, unspecified hyperlipidemia type    3. Anemia, unspecified type    4. Rash of both hands    5. Dyshidrotic eczema    6. Hyperglycemia        Plan:   Hypertension, unspecified type  -     Comprehensive Metabolic Panel; Future; Expected date: 11/20/2020  -     Lipid Panel; Future; Expected date: 11/20/2020    Hyperlipidemia, unspecified hyperlipidemia type    Anemia, unspecified type  -     CBC Auto Differential; Future; Expected date: 11/20/2020    Rash of both hands  -     RPR; Future; Expected date: 11/20/2020    Dyshidrotic eczema  -     predniSONE (DELTASONE) 20 MG tablet; 40 mg daily x 5 days then 20 mg daily x 5 days.  Dispense: 15 tablet; Refill: 0  -     betamethasone valerate 0.1% (VALISONE) 0.1 % Crea; Apply topically 2 (two) times daily.  Dispense: 45 g; Refill: 3    Hyperglycemia  -     Hemoglobin A1C; Future; Expected date: 11/20/2020      Likely dyshidrotic eczema.  Trial of p.o. prednisone.    Trial of betamethasone.    Labs pending.    Diet and exercise.  Likely has prediabetes.    Medication List with Changes/Refills   New Medications    BETAMETHASONE VALERATE 0.1% (VALISONE) 0.1 % CREA    Apply topically 2 (two) times daily.    PREDNISONE (DELTASONE) 20 MG TABLET    40 mg daily x 5 days then 20 mg daily x 5 days.   Current Medications    CLOTRIMAZOLE (LOTRIMIN) 1 % CREAM    Apply topically 2 (two) times daily. for  14 days    HYDROCHLOROTHIAZIDE (HYDRODIURIL) 25 MG TABLET    Take 1 tablet (25 mg total) by mouth once daily.    KETOROLAC (TORADOL) 10 MG TABLET    Take 1 tablet (10 mg total) by mouth every 6 (six) hours as needed for Pain.    METOPROLOL SUCCINATE (TOPROL-XL) 100 MG 24 HR TABLET    Take 1 tablet (100 mg total) by mouth once daily.    NUVARING 0.12-0.015 MG/24 HR VAGINAL RING    One ring vaginally and leave in place for 3 consecutive weeks, then remove for 1 week. Insert new ring 7 days after removal.    ONDANSETRON (ZOFRAN-ODT) 8 MG TBDL    Take 8 mg by mouth as needed.    VALACYCLOVIR (VALTREX) 1000 MG TABLET    TK 2 TS PO Q 12 H            Disclaimer: This note may have been prepared using voice recognition software, it may have not been extensively proofed, as such there could be errors within the text such as sound alike errors.

## 2020-11-21 LAB
ABS NRBC COUNT: 0 X 10 3/UL (ref 0–0.01)
ABSOLUTE BASOPHIL: 0.04 X 10 3/UL (ref 0–0.22)
ABSOLUTE EOSINOPHIL: 0.14 X 10 3/UL (ref 0.04–0.54)
ABSOLUTE IMMATURE GRAN: 0.04 X 10 3/UL (ref 0–0.04)
ABSOLUTE LYMPHOCYTE: 1.95 X 10 3/UL (ref 0.86–4.75)
ABSOLUTE MONOCYTE: 0.47 X 10 3/UL (ref 0.22–1.08)
ALBUMIN SERPL-MCNC: 4.3 G/DL (ref 3.5–5.2)
ALBUMIN/GLOB SERPL ELPH: 1.7 {RATIO} (ref 1–2.7)
ALP ISOS SERPL LEV INH-CCNC: 70 U/L (ref 35–105)
ALT (SGPT): 14 U/L (ref 0–33)
ANION GAP SERPL CALC-SCNC: 12 MMOL/L (ref 8–17)
AST SERPL-CCNC: 14 U/L (ref 0–32)
BASOPHILS NFR BLD: 0.4 % (ref 0.2–1.2)
BILIRUBIN, TOTAL: 0.17 MG/DL (ref 0–1.2)
BUN/CREAT SERPL: 20.1 (ref 6–20)
CALCIUM SERPL-MCNC: 9.3 MG/DL (ref 8.6–10.2)
CARBON DIOXIDE, CO2: 26 MMOL/L (ref 22–29)
CHLORIDE: 101 MMOL/L (ref 98–107)
CHOLEST SERPL-MSCNC: 262 MG/DL (ref 100–200)
CREAT SERPL-MCNC: 0.79 MG/DL (ref 0.5–0.9)
EOSINOPHIL NFR BLD: 1.4 % (ref 0.7–7)
GFR ESTIMATION: 81.02
GLOBULIN: 2.5 G/DL (ref 1.5–4.5)
GLUCOSE: 153 MG/DL (ref 74–106)
HCT VFR BLD AUTO: 40.9 % (ref 37–47)
HDLC SERPL-MCNC: 65 MG/DL
HGB BLD-MCNC: 14 G/DL (ref 12–16)
IMMATURE GRANULOCYTES: 0.4 % (ref 0–0.5)
LDL/HDL RATIO: 2.3 (ref 1–3)
LDLC SERPL CALC-MCNC: 146.4 MG/DL (ref 0–100)
LYMPHOCYTES NFR BLD: 19.7 % (ref 19.3–53.1)
MCH RBC QN AUTO: 29.9 PG (ref 27–32)
MCHC RBC AUTO-ENTMCNC: 34.2 G/DL (ref 32–36)
MCV RBC AUTO: 87.2 FL (ref 82–100)
MONOCYTES NFR BLD: 4.8 % (ref 4.7–12.5)
NEUTROPHILS # BLD AUTO: 7.24 X 10 3/UL (ref 2.15–7.56)
NEUTROPHILS NFR BLD: 73.3 %
NUCLEATED RED BLOOD CELLS: 0 /100 WBC (ref 0–0.2)
PLATELET # BLD AUTO: 246 X 10 3/UL (ref 135–400)
POTASSIUM: 3.4 MMOL/L (ref 3.5–5.1)
PROT SNV-MCNC: 6.8 G/DL (ref 6.4–8.3)
RBC # BLD AUTO: 4.69 X 10 6/UL (ref 4.2–5.4)
RDW-SD: 39.3 FL (ref 37–54)
SODIUM: 139 MMOL/L (ref 136–145)
SYPHILIS TREPONEMAL ANTIBODY: NONREACTIVE
TRIGL SERPL-MCNC: 253 MG/DL (ref 0–150)
UREA NITROGEN (BUN): 15.9 MG/DL (ref 6–20)
WBC # BLD: 9.88 X 10 3/UL (ref 4.3–10.8)

## 2020-11-22 ENCOUNTER — PATIENT MESSAGE (OUTPATIENT)
Dept: FAMILY MEDICINE | Facility: CLINIC | Age: 40
End: 2020-11-22

## 2020-11-23 DIAGNOSIS — I10 HYPERTENSION, UNSPECIFIED TYPE: ICD-10-CM

## 2020-11-23 LAB
ESTIMATED AVERAGE GLUCOSE: 113 MG/DL
GLUCOSE SERPL-MCNC: 137 MG/DL (ref 70–110)
HBA1C MFR BLD: 5.6 % (ref 4–6)

## 2020-11-23 RX ORDER — METOPROLOL SUCCINATE 100 MG/1
100 TABLET, EXTENDED RELEASE ORAL DAILY
Qty: 30 TABLET | Refills: 2 | Status: SHIPPED | OUTPATIENT
Start: 2020-11-23 | End: 2021-03-12 | Stop reason: SDUPTHER

## 2020-11-25 DIAGNOSIS — G47.00 INSOMNIA, UNSPECIFIED TYPE: Primary | ICD-10-CM

## 2020-11-25 RX ORDER — HYDROXYZINE PAMOATE 25 MG/1
25 CAPSULE ORAL NIGHTLY PRN
Qty: 30 CAPSULE | Refills: 1 | Status: SHIPPED | OUTPATIENT
Start: 2020-11-25 | End: 2020-12-25

## 2020-12-07 ENCOUNTER — PATIENT MESSAGE (OUTPATIENT)
Dept: FAMILY MEDICINE | Facility: CLINIC | Age: 40
End: 2020-12-07

## 2020-12-11 ENCOUNTER — OFFICE VISIT (OUTPATIENT)
Dept: FAMILY MEDICINE | Facility: CLINIC | Age: 40
End: 2020-12-11
Payer: MEDICAID

## 2020-12-11 VITALS
OXYGEN SATURATION: 98 % | TEMPERATURE: 98 F | HEIGHT: 66 IN | WEIGHT: 212 LBS | BODY MASS INDEX: 34.07 KG/M2 | HEART RATE: 77 BPM | SYSTOLIC BLOOD PRESSURE: 138 MMHG | DIASTOLIC BLOOD PRESSURE: 84 MMHG | RESPIRATION RATE: 18 BRPM

## 2020-12-11 DIAGNOSIS — R21 RASH: ICD-10-CM

## 2020-12-11 DIAGNOSIS — M25.572 PAIN IN JOINTS OF BOTH FEET: ICD-10-CM

## 2020-12-11 DIAGNOSIS — H20.9 IRITIS: ICD-10-CM

## 2020-12-11 DIAGNOSIS — G89.29 CHRONIC BILATERAL LOW BACK PAIN WITHOUT SCIATICA: Primary | ICD-10-CM

## 2020-12-11 DIAGNOSIS — M54.50 CHRONIC BILATERAL LOW BACK PAIN WITHOUT SCIATICA: Primary | ICD-10-CM

## 2020-12-11 DIAGNOSIS — G47.00 INSOMNIA, UNSPECIFIED TYPE: ICD-10-CM

## 2020-12-11 DIAGNOSIS — M25.571 PAIN IN JOINTS OF BOTH FEET: ICD-10-CM

## 2020-12-11 DIAGNOSIS — M54.9 DORSALGIA, UNSPECIFIED: ICD-10-CM

## 2020-12-11 PROCEDURE — 99214 OFFICE O/P EST MOD 30 MIN: CPT | Mod: S$GLB,,, | Performed by: FAMILY MEDICINE

## 2020-12-11 PROCEDURE — 99214 PR OFFICE/OUTPT VISIT, EST, LEVL IV, 30-39 MIN: ICD-10-PCS | Mod: S$GLB,,, | Performed by: FAMILY MEDICINE

## 2020-12-11 RX ORDER — MELOXICAM 7.5 MG/1
7.5 TABLET ORAL DAILY
Qty: 30 TABLET | Refills: 0 | Status: SHIPPED | OUTPATIENT
Start: 2020-12-11 | End: 2021-01-11

## 2020-12-11 NOTE — PROGRESS NOTES
Subjective:      Patient ID: Breanna Dawson is a 40 y.o. female.    Chief Complaint: Follow-up (testing)      HPI:  40-year-old white female past history of hypertension and anemia who presents for multiple complaints.  Reports rash on hands and feet most resolved.  Start improving 2-3 days after using steroids.  Has been using topical cream with some improvement still.  She did feel much better since starting the steroids.  After 2 days her arthritis most resolved.  She started noticing feeling very sore in the morning.  She had pain in her feet for the 1st 2 hr every day.  This is almost drastically went away since starting steroids.  She does report having more energy.  She started taking an OTC supplement for sleep.  It is helping.  The Vistaril made her very sleepy.  She has had intermittent low back pain for 1-2 years.  Stiffness occurs throughout most of the day.  Worse when she 1st wakes up.  She does report having some irritation and redness her eye 2-3 months ago.  Was worse than normal.    Past Medical History:   Diagnosis Date    Hypertension      Past Surgical History:   Procedure Laterality Date    CHOLECYSTECTOMY       Family History   Problem Relation Age of Onset    Hypertension Mother     Hypertension Brother      Social History     Socioeconomic History    Marital status:      Spouse name: Not on file    Number of children: Not on file    Years of education: Not on file    Highest education level: Not on file   Occupational History    Not on file   Social Needs    Financial resource strain: Somewhat hard    Food insecurity     Worry: Never true     Inability: Never true    Transportation needs     Medical: No     Non-medical: No   Tobacco Use    Smoking status: Current Some Day Smoker     Types: Cigarettes   Substance and Sexual Activity    Alcohol use: No     Frequency: 2-4 times a month     Drinks per session: 1 or 2     Binge frequency: Less than monthly    Drug use: No  "   Sexual activity: Not on file   Lifestyle    Physical activity     Days per week: 3 days     Minutes per session: 30 min    Stress: Not at all   Relationships    Social connections     Talks on phone: More than three times a week     Gets together: Three times a week     Attends Muslim service: Not on file     Active member of club or organization: Yes     Attends meetings of clubs or organizations: More than 4 times per year     Relationship status:    Other Topics Concern    Not on file   Social History Narrative    Not on file     Review of patient's allergies indicates:   Allergen Reactions    Procardia [nifedipine] Nausea And Vomiting     Burning for the inside out       Review of Systems   Constitutional: Positive for unexpected weight change. Negative for activity change.   HENT: Positive for rhinorrhea. Negative for hearing loss and trouble swallowing.    Eyes: Positive for visual disturbance. Negative for discharge.   Respiratory: Negative for chest tightness and wheezing.    Cardiovascular: Negative for chest pain and palpitations.   Gastrointestinal: Negative for blood in stool, constipation, diarrhea and vomiting.   Endocrine: Negative for polydipsia and polyuria.   Genitourinary: Negative for difficulty urinating, dysuria, hematuria and menstrual problem.   Musculoskeletal: Positive for arthralgias and joint swelling. Negative for neck pain.   Neurological: Negative for weakness and headaches.   Psychiatric/Behavioral: Negative for confusion and dysphoric mood.       Objective:       /84 (BP Location: Left arm, Patient Position: Sitting, BP Method: Large (Manual))   Pulse 77   Temp 98 °F (36.7 °C) (Oral)   Resp 18   Ht 5' 6" (1.676 m)   Wt 96.2 kg (212 lb)   SpO2 98%   BMI 34.22 kg/m²   Physical Exam  Constitutional:       Appearance: She is well-developed.   HENT:      Head: Normocephalic and atraumatic.      Nose: Nose normal.   Eyes:      Conjunctiva/sclera: " Conjunctivae normal.      Pupils: Pupils are equal, round, and reactive to light.   Neck:      Musculoskeletal: Normal range of motion and neck supple.   Cardiovascular:      Rate and Rhythm: Normal rate and regular rhythm.      Heart sounds: Normal heart sounds.   Pulmonary:      Effort: Pulmonary effort is normal.      Breath sounds: Normal breath sounds.   Abdominal:      Palpations: Abdomen is soft.   Musculoskeletal: Normal range of motion.   Skin:     General: Skin is warm and dry.      Comments: Rash mostly resolved,   Neurological:      Mental Status: She is alert and oriented to person, place, and time.   Psychiatric:         Behavior: Behavior normal.         Thought Content: Thought content normal.         Assessment:     1. Chronic bilateral low back pain without sciatica    2. Dorsalgia, unspecified    3. Pain in joints of both feet    4. Rash    5. Iritis    6. Insomnia, unspecified type        Plan:   Chronic bilateral low back pain without sciatica  -     X-Ray Lumbar Spine AP And Lateral; Future; Expected date: 12/11/2020  -     HLA B27 Antigen; Future; Expected date: 12/11/2020    Dorsalgia, unspecified    Pain in joints of both feet  -     meloxicam (MOBIC) 7.5 MG tablet; Take 1 tablet (7.5 mg total) by mouth once daily.  Dispense: 30 tablet; Refill: 0  -     Sedimentation rate; Future; Expected date: 12/11/2020  -     C-reactive protein; Future; Expected date: 12/11/2020  -     LETTY IFA, w/Rflx to Pattern/Titer; Future; Expected date: 12/11/2020  -     Cyclic Citrullinated Peptide Antibody, IgG; Future; Expected date: 12/11/2020    Rash    Iritis    Insomnia, unspecified type      I have independently reviewed imaging ordered and obtained today. Findings:  Very mild degenerative changes, no acute fractures.    Recently completed steroids.  Will come in next month for autoimmune workup.    Trial of Mobic for arthralgias.    Continue topical steroid for rash.    Diet and exercise.    Failed Vistaril  for sleep.  Currently doing well with OTC supplement.  Can use this for as long as needed.    Consider trazodone if needed      Medication List with Changes/Refills   New Medications    MELOXICAM (MOBIC) 7.5 MG TABLET    Take 1 tablet (7.5 mg total) by mouth once daily.   Current Medications    BETAMETHASONE VALERATE 0.1% (VALISONE) 0.1 % CREA    Apply topically 2 (two) times daily.    CLOTRIMAZOLE (LOTRIMIN) 1 % CREAM    Apply topically 2 (two) times daily. for 14 days    HYDROCHLOROTHIAZIDE (HYDRODIURIL) 25 MG TABLET    TAKE 1 TABLET(25 MG) BY MOUTH EVERY DAY    HYDROXYZINE PAMOATE (VISTARIL) 25 MG CAP    Take 1 capsule (25 mg total) by mouth nightly as needed.    KETOROLAC (TORADOL) 10 MG TABLET    Take 1 tablet (10 mg total) by mouth every 6 (six) hours as needed for Pain.    METOPROLOL SUCCINATE (TOPROL-XL) 100 MG 24 HR TABLET    Take 1 tablet (100 mg total) by mouth once daily.    NUVARING 0.12-0.015 MG/24 HR VAGINAL RING    One ring vaginally and leave in place for 3 consecutive weeks, then remove for 1 week. Insert new ring 7 days after removal.    ONDANSETRON (ZOFRAN-ODT) 8 MG TBDL    Take 8 mg by mouth as needed.    VALACYCLOVIR (VALTREX) 1000 MG TABLET    TK 2 TS PO Q 12 H   Discontinued Medications    PREDNISONE (DELTASONE) 20 MG TABLET    40 mg daily x 5 days then 20 mg daily x 5 days.            Disclaimer: This note may have been prepared using voice recognition software, it may have not been extensively proofed, as such there could be errors within the text such as sound alike errors.

## 2020-12-20 ENCOUNTER — PATIENT MESSAGE (OUTPATIENT)
Dept: FAMILY MEDICINE | Facility: CLINIC | Age: 40
End: 2020-12-20

## 2021-01-15 ENCOUNTER — CLINICAL SUPPORT (OUTPATIENT)
Dept: FAMILY MEDICINE | Facility: CLINIC | Age: 41
End: 2021-01-15
Payer: MEDICAID

## 2021-01-15 DIAGNOSIS — M25.572 PAIN IN JOINTS OF BOTH FEET: ICD-10-CM

## 2021-01-15 DIAGNOSIS — G89.29 CHRONIC BILATERAL LOW BACK PAIN WITHOUT SCIATICA: ICD-10-CM

## 2021-01-15 DIAGNOSIS — M25.571 PAIN IN JOINTS OF BOTH FEET: ICD-10-CM

## 2021-01-15 DIAGNOSIS — M54.50 CHRONIC BILATERAL LOW BACK PAIN WITHOUT SCIATICA: ICD-10-CM

## 2021-01-18 LAB
ANA SER-ACNC: NEGATIVE
ANTI-CCP: <8 U/ML
CRP QUALITATIVE: POSITIVE MG/L
CRP QUANTITATIVE: 8.7 MG/L
SED RATE (WESTERGREN): 14 MM/HR (ref 0–20)

## 2021-01-22 LAB — HLA B27 ANTIGEN: NEGATIVE

## 2021-02-01 ENCOUNTER — OFFICE VISIT (OUTPATIENT)
Dept: FAMILY MEDICINE | Facility: CLINIC | Age: 41
End: 2021-02-01
Payer: MEDICAID

## 2021-02-01 VITALS
SYSTOLIC BLOOD PRESSURE: 130 MMHG | HEIGHT: 66 IN | HEART RATE: 72 BPM | WEIGHT: 216 LBS | TEMPERATURE: 98 F | BODY MASS INDEX: 34.72 KG/M2 | OXYGEN SATURATION: 97 % | DIASTOLIC BLOOD PRESSURE: 80 MMHG

## 2021-02-01 DIAGNOSIS — L30.1 VESICULAR ECZEMA OF HANDS AND FEET: ICD-10-CM

## 2021-02-01 DIAGNOSIS — E24.9 HYPERCORTISOLISM: Primary | ICD-10-CM

## 2021-02-01 DIAGNOSIS — M25.50 ARTHRALGIA, UNSPECIFIED JOINT: ICD-10-CM

## 2021-02-01 DIAGNOSIS — L70.9 ACNE, UNSPECIFIED ACNE TYPE: ICD-10-CM

## 2021-02-01 DIAGNOSIS — R63.5 WEIGHT GAIN: ICD-10-CM

## 2021-02-01 PROCEDURE — 99214 OFFICE O/P EST MOD 30 MIN: CPT | Mod: S$GLB,,, | Performed by: FAMILY MEDICINE

## 2021-02-01 PROCEDURE — 99214 PR OFFICE/OUTPT VISIT, EST, LEVL IV, 30-39 MIN: ICD-10-PCS | Mod: S$GLB,,, | Performed by: FAMILY MEDICINE

## 2021-02-01 RX ORDER — DEXAMETHASONE 1 MG/1
TABLET ORAL
Qty: 1 TABLET | Refills: 0 | Status: SHIPPED | OUTPATIENT
Start: 2021-02-01 | End: 2021-10-08

## 2021-02-01 RX ORDER — CLOBETASOL PROPIONATE 0.5 MG/G
CREAM TOPICAL 2 TIMES DAILY
Qty: 45 G | Refills: 3 | Status: SHIPPED | OUTPATIENT
Start: 2021-02-01 | End: 2021-10-08 | Stop reason: SDUPTHER

## 2021-02-05 LAB — CORTISOL: 1.4 UG/DL

## 2021-02-21 ENCOUNTER — TELEPHONE (OUTPATIENT)
Dept: FAMILY MEDICINE | Facility: CLINIC | Age: 41
End: 2021-02-21

## 2021-03-12 DIAGNOSIS — I10 HYPERTENSION, UNSPECIFIED TYPE: ICD-10-CM

## 2021-03-12 RX ORDER — METOPROLOL SUCCINATE 100 MG/1
100 TABLET, EXTENDED RELEASE ORAL DAILY
Qty: 30 TABLET | Refills: 2 | Status: SHIPPED | OUTPATIENT
Start: 2021-03-12 | End: 2021-06-16

## 2021-04-09 ENCOUNTER — OFFICE VISIT (OUTPATIENT)
Dept: FAMILY MEDICINE | Facility: CLINIC | Age: 41
End: 2021-04-09
Payer: MEDICAID

## 2021-04-09 VITALS
WEIGHT: 211 LBS | SYSTOLIC BLOOD PRESSURE: 126 MMHG | OXYGEN SATURATION: 99 % | BODY MASS INDEX: 33.91 KG/M2 | DIASTOLIC BLOOD PRESSURE: 74 MMHG | TEMPERATURE: 99 F | RESPIRATION RATE: 18 BRPM | HEIGHT: 66 IN | HEART RATE: 77 BPM

## 2021-04-09 DIAGNOSIS — L30.1 DYSHIDROTIC ECZEMA: Primary | ICD-10-CM

## 2021-04-09 DIAGNOSIS — I10 HYPERTENSION, UNSPECIFIED TYPE: ICD-10-CM

## 2021-04-09 DIAGNOSIS — K58.9 IRRITABLE BOWEL SYNDROME, UNSPECIFIED TYPE: ICD-10-CM

## 2021-04-09 PROCEDURE — 99214 OFFICE O/P EST MOD 30 MIN: CPT | Mod: S$GLB,,, | Performed by: FAMILY MEDICINE

## 2021-04-09 PROCEDURE — 99214 PR OFFICE/OUTPT VISIT, EST, LEVL IV, 30-39 MIN: ICD-10-PCS | Mod: S$GLB,,, | Performed by: FAMILY MEDICINE

## 2021-04-09 RX ORDER — TACROLIMUS 1 MG/G
OINTMENT TOPICAL 2 TIMES DAILY
Qty: 100 G | Refills: 1 | Status: SHIPPED | OUTPATIENT
Start: 2021-04-09 | End: 2021-10-08

## 2021-07-07 ENCOUNTER — PATIENT MESSAGE (OUTPATIENT)
Dept: FAMILY MEDICINE | Facility: CLINIC | Age: 41
End: 2021-07-07

## 2021-07-08 DIAGNOSIS — B00.1 RECURRENT HERPES LABIALIS: ICD-10-CM

## 2021-07-08 RX ORDER — VALACYCLOVIR HYDROCHLORIDE 1 G/1
TABLET, FILM COATED ORAL
Qty: 4 TABLET | Refills: 5 | Status: SHIPPED | OUTPATIENT
Start: 2021-07-08 | End: 2021-10-08 | Stop reason: SDUPTHER

## 2021-08-01 ENCOUNTER — PATIENT MESSAGE (OUTPATIENT)
Dept: FAMILY MEDICINE | Facility: CLINIC | Age: 41
End: 2021-08-01

## 2021-08-02 DIAGNOSIS — B37.9 YEAST INFECTION: Primary | ICD-10-CM

## 2021-08-02 RX ORDER — FLUCONAZOLE 150 MG/1
150 TABLET ORAL DAILY
Qty: 1 TABLET | Refills: 0 | Status: SHIPPED | OUTPATIENT
Start: 2021-08-02 | End: 2021-08-03

## 2021-09-01 ENCOUNTER — OFFICE VISIT (OUTPATIENT)
Dept: FAMILY MEDICINE | Facility: CLINIC | Age: 41
End: 2021-09-01
Payer: MEDICAID

## 2021-09-01 ENCOUNTER — TELEPHONE (OUTPATIENT)
Dept: FAMILY MEDICINE | Facility: CLINIC | Age: 41
End: 2021-09-01

## 2021-09-01 VITALS — OXYGEN SATURATION: 98 % | HEART RATE: 78 BPM | TEMPERATURE: 99 F

## 2021-09-01 DIAGNOSIS — R51.9 NONINTRACTABLE HEADACHE, UNSPECIFIED CHRONICITY PATTERN, UNSPECIFIED HEADACHE TYPE: ICD-10-CM

## 2021-09-01 DIAGNOSIS — U07.1 COVID-19: Primary | ICD-10-CM

## 2021-09-01 LAB
CTP QC/QA: YES
SARS-COV-2 RDRP RESP QL NAA+PROBE: POSITIVE

## 2021-09-01 PROCEDURE — U0002 COVID-19 LAB TEST NON-CDC: HCPCS | Mod: QW,,, | Performed by: FAMILY MEDICINE

## 2021-09-01 PROCEDURE — U0002: ICD-10-PCS | Mod: QW,,, | Performed by: FAMILY MEDICINE

## 2021-09-01 RX ORDER — CODEINE PHOSPHATE AND GUAIFENESIN 10; 100 MG/5ML; MG/5ML
10 SOLUTION ORAL EVERY 6 HOURS PRN
Qty: 120 ML | Refills: 0 | Status: SHIPPED | OUTPATIENT
Start: 2021-09-01 | End: 2021-09-11

## 2021-09-01 RX ORDER — AZITHROMYCIN 250 MG/1
TABLET, FILM COATED ORAL
Qty: 6 TABLET | Refills: 0 | Status: SHIPPED | OUTPATIENT
Start: 2021-09-01 | End: 2021-10-08

## 2021-09-08 ENCOUNTER — TELEPHONE (OUTPATIENT)
Dept: FAMILY MEDICINE | Facility: CLINIC | Age: 41
End: 2021-09-08

## 2021-09-08 ENCOUNTER — PATIENT MESSAGE (OUTPATIENT)
Dept: FAMILY MEDICINE | Facility: CLINIC | Age: 41
End: 2021-09-08

## 2021-09-08 DIAGNOSIS — R11.0 NAUSEA: Primary | ICD-10-CM

## 2021-09-08 RX ORDER — PROMETHAZINE HYDROCHLORIDE 25 MG/1
25 TABLET ORAL EVERY 6 HOURS PRN
Qty: 20 TABLET | Refills: 0 | Status: SHIPPED | OUTPATIENT
Start: 2021-09-08 | End: 2021-10-08

## 2021-10-08 ENCOUNTER — OFFICE VISIT (OUTPATIENT)
Dept: FAMILY MEDICINE | Facility: CLINIC | Age: 41
End: 2021-10-08
Payer: MEDICAID

## 2021-10-08 VITALS
SYSTOLIC BLOOD PRESSURE: 120 MMHG | BODY MASS INDEX: 34.61 KG/M2 | DIASTOLIC BLOOD PRESSURE: 86 MMHG | WEIGHT: 215.38 LBS | HEIGHT: 66 IN | HEART RATE: 81 BPM | OXYGEN SATURATION: 99 % | TEMPERATURE: 98 F

## 2021-10-08 DIAGNOSIS — K21.9 GASTROESOPHAGEAL REFLUX DISEASE, UNSPECIFIED WHETHER ESOPHAGITIS PRESENT: ICD-10-CM

## 2021-10-08 DIAGNOSIS — L30.1 VESICULAR ECZEMA OF HANDS AND FEET: ICD-10-CM

## 2021-10-08 DIAGNOSIS — B00.1 RECURRENT HERPES LABIALIS: ICD-10-CM

## 2021-10-08 DIAGNOSIS — Z23 IMMUNIZATION DUE: ICD-10-CM

## 2021-10-08 DIAGNOSIS — Z12.39 ENCOUNTER FOR SCREENING FOR MALIGNANT NEOPLASM OF BREAST, UNSPECIFIED SCREENING MODALITY: ICD-10-CM

## 2021-10-08 DIAGNOSIS — I10 HYPERTENSION, UNSPECIFIED TYPE: ICD-10-CM

## 2021-10-08 DIAGNOSIS — E78.5 HYPERLIPIDEMIA, UNSPECIFIED HYPERLIPIDEMIA TYPE: ICD-10-CM

## 2021-10-08 DIAGNOSIS — N92.6 IRREGULAR PERIODS: ICD-10-CM

## 2021-10-08 DIAGNOSIS — D64.9 ANEMIA, UNSPECIFIED TYPE: Primary | ICD-10-CM

## 2021-10-08 PROCEDURE — 99214 PR OFFICE/OUTPT VISIT, EST, LEVL IV, 30-39 MIN: ICD-10-PCS | Mod: S$GLB,,, | Performed by: FAMILY MEDICINE

## 2021-10-08 PROCEDURE — 99214 OFFICE O/P EST MOD 30 MIN: CPT | Mod: S$GLB,,, | Performed by: FAMILY MEDICINE

## 2021-10-08 RX ORDER — METOPROLOL SUCCINATE 100 MG/1
100 TABLET, EXTENDED RELEASE ORAL DAILY
Qty: 30 TABLET | Refills: 5 | Status: SHIPPED | OUTPATIENT
Start: 2021-10-08 | End: 2022-01-14 | Stop reason: SDUPTHER

## 2021-10-08 RX ORDER — HYDROCHLOROTHIAZIDE 25 MG/1
25 TABLET ORAL DAILY
Qty: 30 TABLET | Refills: 5 | Status: SHIPPED | OUTPATIENT
Start: 2021-10-08 | End: 2022-01-14 | Stop reason: SDUPTHER

## 2021-10-08 RX ORDER — CLOBETASOL PROPIONATE 0.5 MG/G
CREAM TOPICAL 2 TIMES DAILY
Qty: 45 G | Refills: 3 | Status: SHIPPED | OUTPATIENT
Start: 2021-10-08

## 2021-10-08 RX ORDER — VALACYCLOVIR HYDROCHLORIDE 1 G/1
TABLET, FILM COATED ORAL
Qty: 4 TABLET | Refills: 5 | Status: SHIPPED | OUTPATIENT
Start: 2021-10-08 | End: 2022-11-03 | Stop reason: SDUPTHER

## 2021-10-08 RX ORDER — PANTOPRAZOLE SODIUM 40 MG/1
40 TABLET, DELAYED RELEASE ORAL DAILY
Qty: 30 TABLET | Refills: 3 | Status: SHIPPED | OUTPATIENT
Start: 2021-10-08 | End: 2022-01-14 | Stop reason: SDUPTHER

## 2021-10-09 LAB
ABS NRBC COUNT: 0 X 10 3/UL (ref 0–0.01)
ABSOLUTE BASOPHIL: 0.05 X 10 3/UL (ref 0–0.22)
ABSOLUTE EOSINOPHIL: 0.14 X 10 3/UL (ref 0.04–0.54)
ABSOLUTE IMMATURE GRAN: 0.05 X 10 3/UL (ref 0–0.04)
ABSOLUTE LYMPHOCYTE: 2.34 X 10 3/UL (ref 0.86–4.75)
ABSOLUTE MONOCYTE: 0.41 X 10 3/UL (ref 0.22–1.08)
ALBUMIN SERPL-MCNC: 4.2 G/DL (ref 3.5–5.2)
ALBUMIN/GLOB SERPL ELPH: 1.6 {RATIO} (ref 1–2.7)
ALP ISOS SERPL LEV INH-CCNC: 75 U/L (ref 35–105)
ALT (SGPT): 23 U/L (ref 0–33)
ANION GAP SERPL CALC-SCNC: 15 MMOL/L (ref 8–17)
AST SERPL-CCNC: 18 U/L (ref 0–32)
BASOPHILS NFR BLD: 0.6 % (ref 0.2–1.2)
BILIRUBIN, TOTAL: 0.3 MG/DL (ref 0–1.2)
BUN/CREAT SERPL: 14.3 (ref 6–20)
CALCIUM SERPL-MCNC: 9.5 MG/DL (ref 8.6–10.2)
CARBON DIOXIDE, CO2: 22 MMOL/L (ref 22–29)
CHLORIDE: 101 MMOL/L (ref 98–107)
CHOLEST SERPL-MSCNC: 258 MG/DL (ref 100–200)
CREAT SERPL-MCNC: 0.8 MG/DL (ref 0.5–0.9)
EOSINOPHIL NFR BLD: 1.5 % (ref 0.7–7)
GFR ESTIMATION: 79.44
GLOBULIN: 2.7 G/DL (ref 1.5–4.5)
GLUCOSE: 98 MG/DL (ref 74–106)
HCT VFR BLD AUTO: 41.6 % (ref 37–47)
HDLC SERPL-MCNC: 59 MG/DL
HGB BLD-MCNC: 14 G/DL (ref 12–16)
IMMATURE GRANULOCYTES: 0.6 % (ref 0–0.5)
LDL/HDL RATIO: 2.4 (ref 1–3)
LDLC SERPL CALC-MCNC: 139 MG/DL (ref 0–100)
LYMPHOCYTES NFR BLD: 25.9 % (ref 19.3–53.1)
MCH RBC QN AUTO: 28.5 PG (ref 27–32)
MCHC RBC AUTO-ENTMCNC: 33.7 G/DL (ref 32–36)
MCV RBC AUTO: 84.6 FL (ref 82–100)
MONOCYTES NFR BLD: 4.5 % (ref 4.7–12.5)
NEUTROPHILS # BLD AUTO: 6.06 X 10 3/UL (ref 2.15–7.56)
NEUTROPHILS NFR BLD: 66.9 %
NUCLEATED RED BLOOD CELLS: 0 /100 WBC (ref 0–0.2)
PLATELET # BLD AUTO: 278 X 10 3/UL (ref 135–400)
POTASSIUM: 3.9 MMOL/L (ref 3.5–5.1)
PROT SNV-MCNC: 6.9 G/DL (ref 6.4–8.3)
RBC # BLD AUTO: 4.92 X 10 6/UL (ref 4.2–5.4)
RDW-SD: 39.4 FL (ref 37–54)
SODIUM: 138 MMOL/L (ref 136–145)
T4, FREE: 1.16 NG/DL (ref 0.93–1.7)
TRIGL SERPL-MCNC: 300 MG/DL (ref 0–150)
TSH SERPL DL<=0.005 MIU/L-ACNC: 1.01 UIU/ML (ref 0.27–4.2)
UREA NITROGEN (BUN): 11.4 MG/DL (ref 6–20)
WBC # BLD: 9.05 X 10 3/UL (ref 4.3–10.8)

## 2021-10-14 ENCOUNTER — TELEPHONE (OUTPATIENT)
Dept: FAMILY MEDICINE | Facility: CLINIC | Age: 41
End: 2021-10-14

## 2021-10-22 DIAGNOSIS — N63.0 BREAST LUMP: Primary | ICD-10-CM

## 2021-10-26 ENCOUNTER — PATIENT MESSAGE (OUTPATIENT)
Dept: FAMILY MEDICINE | Facility: CLINIC | Age: 41
End: 2021-10-26
Payer: MEDICAID

## 2021-11-03 ENCOUNTER — TELEPHONE (OUTPATIENT)
Dept: FAMILY MEDICINE | Facility: CLINIC | Age: 41
End: 2021-11-03
Payer: MEDICAID

## 2021-11-11 ENCOUNTER — PATIENT MESSAGE (OUTPATIENT)
Dept: FAMILY MEDICINE | Facility: CLINIC | Age: 41
End: 2021-11-11
Payer: MEDICAID

## 2021-11-12 ENCOUNTER — TELEPHONE (OUTPATIENT)
Dept: FAMILY MEDICINE | Facility: CLINIC | Age: 41
End: 2021-11-12
Payer: MEDICAID

## 2021-11-18 ENCOUNTER — PATIENT OUTREACH (OUTPATIENT)
Dept: ADMINISTRATIVE | Facility: HOSPITAL | Age: 41
End: 2021-11-18
Payer: MEDICAID

## 2021-11-19 ENCOUNTER — OFFICE VISIT (OUTPATIENT)
Dept: FAMILY MEDICINE | Facility: CLINIC | Age: 41
End: 2021-11-19
Payer: MEDICAID

## 2021-11-19 VITALS
OXYGEN SATURATION: 98 % | WEIGHT: 225 LBS | DIASTOLIC BLOOD PRESSURE: 100 MMHG | SYSTOLIC BLOOD PRESSURE: 132 MMHG | HEIGHT: 66 IN | BODY MASS INDEX: 36.16 KG/M2 | HEART RATE: 68 BPM | TEMPERATURE: 98 F

## 2021-11-19 DIAGNOSIS — R13.10 DYSPHAGIA, UNSPECIFIED TYPE: Primary | ICD-10-CM

## 2021-11-19 DIAGNOSIS — I10 HYPERTENSION, UNSPECIFIED TYPE: ICD-10-CM

## 2021-11-19 DIAGNOSIS — I73.00 RAYNAUD'S DISEASE WITHOUT GANGRENE: ICD-10-CM

## 2021-11-19 PROCEDURE — 99214 PR OFFICE/OUTPT VISIT, EST, LEVL IV, 30-39 MIN: ICD-10-PCS | Mod: S$GLB,,, | Performed by: FAMILY MEDICINE

## 2021-11-19 PROCEDURE — 99214 OFFICE O/P EST MOD 30 MIN: CPT | Mod: S$GLB,,, | Performed by: FAMILY MEDICINE

## 2021-11-19 RX ORDER — AMLODIPINE BESYLATE 5 MG/1
5 TABLET ORAL DAILY
Qty: 30 TABLET | Refills: 1 | Status: SHIPPED | OUTPATIENT
Start: 2021-11-19 | End: 2022-01-14 | Stop reason: SDUPTHER

## 2021-12-09 ENCOUNTER — PATIENT MESSAGE (OUTPATIENT)
Dept: FAMILY MEDICINE | Facility: CLINIC | Age: 41
End: 2021-12-09
Payer: MEDICAID

## 2021-12-15 ENCOUNTER — OFFICE VISIT (OUTPATIENT)
Dept: FAMILY MEDICINE | Facility: CLINIC | Age: 41
End: 2021-12-15
Payer: MEDICAID

## 2021-12-15 VITALS
HEART RATE: 85 BPM | HEIGHT: 66 IN | TEMPERATURE: 99 F | BODY MASS INDEX: 36 KG/M2 | DIASTOLIC BLOOD PRESSURE: 68 MMHG | OXYGEN SATURATION: 99 % | SYSTOLIC BLOOD PRESSURE: 110 MMHG | WEIGHT: 224 LBS

## 2021-12-15 DIAGNOSIS — L60.0 INGROWN TOENAIL: Primary | ICD-10-CM

## 2021-12-15 PROCEDURE — 11730 AVULSION NAIL PLATE SIMPLE 1: CPT | Mod: T6,S$GLB,, | Performed by: FAMILY MEDICINE

## 2021-12-15 PROCEDURE — 11730 PR REMOVAL OF NAIL PLATE: ICD-10-PCS | Mod: T6,S$GLB,, | Performed by: FAMILY MEDICINE

## 2021-12-15 PROCEDURE — 99499 UNLISTED E&M SERVICE: CPT | Mod: S$GLB,,, | Performed by: FAMILY MEDICINE

## 2021-12-15 PROCEDURE — 99499 NO LOS: ICD-10-PCS | Mod: S$GLB,,, | Performed by: FAMILY MEDICINE

## 2021-12-15 RX ORDER — ACETAMINOPHEN AND CODEINE PHOSPHATE 300; 30 MG/1; MG/1
1 TABLET ORAL EVERY 6 HOURS PRN
Qty: 6 TABLET | Refills: 0 | Status: SHIPPED | OUTPATIENT
Start: 2021-12-15 | End: 2021-12-25

## 2022-01-14 ENCOUNTER — OFFICE VISIT (OUTPATIENT)
Dept: FAMILY MEDICINE | Facility: CLINIC | Age: 42
End: 2022-01-14
Payer: MEDICAID

## 2022-01-14 VITALS
SYSTOLIC BLOOD PRESSURE: 106 MMHG | TEMPERATURE: 98 F | HEART RATE: 72 BPM | WEIGHT: 222 LBS | HEIGHT: 66 IN | DIASTOLIC BLOOD PRESSURE: 62 MMHG | OXYGEN SATURATION: 96 % | BODY MASS INDEX: 35.68 KG/M2

## 2022-01-14 DIAGNOSIS — R70.0 ESR RAISED: Primary | ICD-10-CM

## 2022-01-14 DIAGNOSIS — Z30.9 ENCOUNTER FOR CONTRACEPTIVE MANAGEMENT, UNSPECIFIED TYPE: ICD-10-CM

## 2022-01-14 DIAGNOSIS — I10 HYPERTENSION, UNSPECIFIED TYPE: ICD-10-CM

## 2022-01-14 DIAGNOSIS — E78.5 HYPERLIPIDEMIA, UNSPECIFIED HYPERLIPIDEMIA TYPE: ICD-10-CM

## 2022-01-14 DIAGNOSIS — K21.9 GASTROESOPHAGEAL REFLUX DISEASE, UNSPECIFIED WHETHER ESOPHAGITIS PRESENT: ICD-10-CM

## 2022-01-14 DIAGNOSIS — D64.9 ANEMIA, UNSPECIFIED TYPE: ICD-10-CM

## 2022-01-14 PROCEDURE — 1159F MED LIST DOCD IN RCRD: CPT | Mod: CPTII,S$GLB,, | Performed by: FAMILY MEDICINE

## 2022-01-14 PROCEDURE — 3078F PR MOST RECENT DIASTOLIC BLOOD PRESSURE < 80 MM HG: ICD-10-PCS | Mod: CPTII,S$GLB,, | Performed by: FAMILY MEDICINE

## 2022-01-14 PROCEDURE — 3008F PR BODY MASS INDEX (BMI) DOCUMENTED: ICD-10-PCS | Mod: CPTII,S$GLB,, | Performed by: FAMILY MEDICINE

## 2022-01-14 PROCEDURE — 3008F BODY MASS INDEX DOCD: CPT | Mod: CPTII,S$GLB,, | Performed by: FAMILY MEDICINE

## 2022-01-14 PROCEDURE — 1159F PR MEDICATION LIST DOCUMENTED IN MEDICAL RECORD: ICD-10-PCS | Mod: CPTII,S$GLB,, | Performed by: FAMILY MEDICINE

## 2022-01-14 PROCEDURE — 3078F DIAST BP <80 MM HG: CPT | Mod: CPTII,S$GLB,, | Performed by: FAMILY MEDICINE

## 2022-01-14 PROCEDURE — 3074F PR MOST RECENT SYSTOLIC BLOOD PRESSURE < 130 MM HG: ICD-10-PCS | Mod: CPTII,S$GLB,, | Performed by: FAMILY MEDICINE

## 2022-01-14 PROCEDURE — 99214 PR OFFICE/OUTPT VISIT, EST, LEVL IV, 30-39 MIN: ICD-10-PCS | Mod: S$GLB,,, | Performed by: FAMILY MEDICINE

## 2022-01-14 PROCEDURE — 3074F SYST BP LT 130 MM HG: CPT | Mod: CPTII,S$GLB,, | Performed by: FAMILY MEDICINE

## 2022-01-14 PROCEDURE — 99214 OFFICE O/P EST MOD 30 MIN: CPT | Mod: S$GLB,,, | Performed by: FAMILY MEDICINE

## 2022-01-14 RX ORDER — METOPROLOL SUCCINATE 100 MG/1
100 TABLET, EXTENDED RELEASE ORAL DAILY
Qty: 30 TABLET | Refills: 5 | Status: SHIPPED | OUTPATIENT
Start: 2022-01-14 | End: 2022-07-15 | Stop reason: SDUPTHER

## 2022-01-14 RX ORDER — AMLODIPINE BESYLATE 5 MG/1
5 TABLET ORAL DAILY
Qty: 30 TABLET | Refills: 1 | Status: SHIPPED | OUTPATIENT
Start: 2022-01-14 | End: 2022-03-30 | Stop reason: SDUPTHER

## 2022-01-14 RX ORDER — PANTOPRAZOLE SODIUM 40 MG/1
40 TABLET, DELAYED RELEASE ORAL DAILY
Qty: 30 TABLET | Refills: 3 | Status: SHIPPED | OUTPATIENT
Start: 2022-01-14 | End: 2022-06-08

## 2022-01-14 RX ORDER — HYDROCHLOROTHIAZIDE 25 MG/1
25 TABLET ORAL DAILY
Qty: 30 TABLET | Refills: 5 | Status: SHIPPED | OUTPATIENT
Start: 2022-01-14 | End: 2022-07-15 | Stop reason: SDUPTHER

## 2022-01-14 RX ORDER — ETONOGESTREL AND ETHINYL ESTRADIOL VAGINAL RING .015; .12 MG/D; MG/D
1 RING VAGINAL
Qty: 1 EACH | Refills: 11 | Status: SHIPPED | OUTPATIENT
Start: 2022-01-14 | End: 2022-07-15 | Stop reason: SDUPTHER

## 2022-01-14 NOTE — PROGRESS NOTES
Subjective:      Patient ID: Breanna Dawson is a 41 y.o. female.    Chief Complaint: Follow-up      HPI:  41-year-old white female past history of hypertension who presents for chronic medical management.  Reports she has been out of her metoprolol.  She is having problems getting this from her current pharmacy.  She would like to try new pharmacy.  She has also been out of her NuvaRing since September.  She is not sexually active however.  She continues have problems swallowing.  They called about her swallow study but did not confirm the date nor the location therefore she did not make it.    Past Medical History:   Diagnosis Date    Hypertension      Past Surgical History:   Procedure Laterality Date    CHOLECYSTECTOMY       Family History   Problem Relation Age of Onset    Hypertension Mother     Hypertension Brother      Social History     Socioeconomic History    Marital status:    Tobacco Use    Smoking status: Current Some Day Smoker     Types: Cigarettes    Smokeless tobacco: Never Used   Substance and Sexual Activity    Alcohol use: No    Drug use: No     Social Determinants of Health     Financial Resource Strain: Low Risk     Difficulty of Paying Living Expenses: Not very hard   Food Insecurity: No Food Insecurity    Worried About Running Out of Food in the Last Year: Never true    Ran Out of Food in the Last Year: Never true   Transportation Needs: No Transportation Needs    Lack of Transportation (Medical): No    Lack of Transportation (Non-Medical): No   Physical Activity: Insufficiently Active    Days of Exercise per Week: 3 days    Minutes of Exercise per Session: 20 min   Stress: No Stress Concern Present    Feeling of Stress : Not at all   Social Connections: Unknown    Frequency of Communication with Friends and Family: More than three times a week    Frequency of Social Gatherings with Friends and Family: Twice a week    Active Member of Clubs or Organizations: Yes  "   Attends Club or Organization Meetings: More than 4 times per year    Marital Status:    Housing Stability: Unknown    Unable to Pay for Housing in the Last Year: No    Unstable Housing in the Last Year: No     Review of patient's allergies indicates:   Allergen Reactions    Procardia [nifedipine] Nausea And Vomiting     Burning for the inside out       Review of Systems   Constitutional: Negative for activity change, appetite change, chills, fatigue and fever.   HENT: Negative for congestion, ear pain, postnasal drip, rhinorrhea, sinus pressure, sinus pain and sore throat.    Eyes: Negative for pain and redness.   Respiratory: Negative for cough, chest tightness and shortness of breath.    Cardiovascular: Negative for chest pain and leg swelling.   Gastrointestinal: Negative for abdominal distention, abdominal pain, constipation, diarrhea, nausea and vomiting.   Endocrine: Negative for cold intolerance and heat intolerance.   Genitourinary: Negative for dysuria, frequency and hematuria.   Musculoskeletal: Negative for arthralgias, back pain and joint swelling.   Skin: Negative for pallor.   Neurological: Negative for dizziness and light-headedness.   Psychiatric/Behavioral: Negative for agitation, decreased concentration and hallucinations. The patient is not nervous/anxious.        Objective:       /62 (BP Location: Left arm, Patient Position: Sitting)   Pulse 72   Temp 98.2 °F (36.8 °C) (Oral)   Ht 5' 6" (1.676 m)   Wt 100.7 kg (222 lb)   SpO2 96%   BMI 35.83 kg/m²   Physical Exam  Constitutional:       Appearance: She is well-developed and well-nourished.   HENT:      Head: Normocephalic and atraumatic.      Nose: Nose normal.   Eyes:      Extraocular Movements: EOM normal.      Conjunctiva/sclera: Conjunctivae normal.      Pupils: Pupils are equal, round, and reactive to light.   Cardiovascular:      Rate and Rhythm: Normal rate and regular rhythm.      Heart sounds: Normal heart " sounds.   Pulmonary:      Effort: Pulmonary effort is normal.      Breath sounds: Normal breath sounds.   Abdominal:      Palpations: Abdomen is soft.   Musculoskeletal:         General: Normal range of motion.      Cervical back: Normal range of motion and neck supple.      Comments: Healing toenail.   Skin:     General: Skin is warm and dry.   Neurological:      Mental Status: She is alert and oriented to person, place, and time.   Psychiatric:         Mood and Affect: Mood and affect normal.         Behavior: Behavior normal.         Thought Content: Thought content normal.         Assessment:     1. ESR raised    2. Hypertension, unspecified type    3. Hyperlipidemia, unspecified hyperlipidemia type    4. Anemia, unspecified type    5. Gastroesophageal reflux disease, unspecified whether esophagitis present    6. Encounter for contraceptive management, unspecified type        Plan:   ESR raised  -     Sedimentation rate; Future; Expected date: 01/14/2022  -     C-reactive protein; Future; Expected date: 01/14/2022    Hypertension, unspecified type  -     Comprehensive Metabolic Panel; Future; Expected date: 01/14/2022  -     amLODIPine (NORVASC) 5 MG tablet; Take 1 tablet (5 mg total) by mouth once daily.  Dispense: 30 tablet; Refill: 1  -     hydroCHLOROthiazide (HYDRODIURIL) 25 MG tablet; Take 1 tablet (25 mg total) by mouth once daily.  Dispense: 30 tablet; Refill: 5  -     metoprolol succinate (TOPROL-XL) 100 MG 24 hr tablet; Take 1 tablet (100 mg total) by mouth once daily.  Dispense: 30 tablet; Refill: 5    Hyperlipidemia, unspecified hyperlipidemia type    Anemia, unspecified type  -     CBC Auto Differential; Future; Expected date: 01/14/2022    Gastroesophageal reflux disease, unspecified whether esophagitis present  -     pantoprazole (PROTONIX) 40 MG tablet; Take 1 tablet (40 mg total) by mouth once daily.  Dispense: 30 tablet; Refill: 3    Encounter for contraceptive management, unspecified type  -      etonogestreL-ethinyl estradioL (NUVARING) 0.12-0.015 mg/24 hr vaginal ring; Place 1 each vaginally every 28 days.  Dispense: 1 each; Refill: 11      Labs pending.    Continue current meds.    Resend swallow study.    Follow-up in 6 months.  Sooner if needed.    Medication List with Changes/Refills   New Medications    ETONOGESTREL-ETHINYL ESTRADIOL (NUVARING) 0.12-0.015 MG/24 HR VAGINAL RING    Place 1 each vaginally every 28 days.   Current Medications    CLOBETASOL (TEMOVATE) 0.05 % CREAM    Apply topically 2 (two) times daily.    VALACYCLOVIR (VALTREX) 1000 MG TABLET    TK 2 TS PO Q 12 H   Changed and/or Refilled Medications    Modified Medication Previous Medication    AMLODIPINE (NORVASC) 5 MG TABLET amLODIPine (NORVASC) 5 MG tablet       Take 1 tablet (5 mg total) by mouth once daily.    Take 1 tablet (5 mg total) by mouth once daily.    HYDROCHLOROTHIAZIDE (HYDRODIURIL) 25 MG TABLET hydroCHLOROthiazide (HYDRODIURIL) 25 MG tablet       Take 1 tablet (25 mg total) by mouth once daily.    Take 1 tablet (25 mg total) by mouth once daily.    METOPROLOL SUCCINATE (TOPROL-XL) 100 MG 24 HR TABLET metoprolol succinate (TOPROL-XL) 100 MG 24 hr tablet       Take 1 tablet (100 mg total) by mouth once daily.    Take 1 tablet (100 mg total) by mouth once daily.    PANTOPRAZOLE (PROTONIX) 40 MG TABLET pantoprazole (PROTONIX) 40 MG tablet       Take 1 tablet (40 mg total) by mouth once daily.    Take 1 tablet (40 mg total) by mouth once daily.            Disclaimer: This note may have been prepared using voice recognition software, it may have not been extensively proofed, as such there could be errors within the text such as sound alike errors.

## 2022-01-15 LAB
ALBUMIN SERPL-MCNC: 4.5 G/DL (ref 3.5–5.2)
ALBUMIN/GLOB SERPL ELPH: 2.1 {RATIO} (ref 1–2.7)
ALP ISOS SERPL LEV INH-CCNC: 77 U/L (ref 35–105)
ALT (SGPT): 104 U/L (ref 0–33)
ANION GAP SERPL CALC-SCNC: 10 MMOL/L (ref 8–17)
AST SERPL-CCNC: 95 U/L (ref 0–32)
BILIRUBIN, TOTAL: 0.22 MG/DL (ref 0–1.2)
BUN/CREAT SERPL: 14.9 (ref 6–20)
CALCIUM SERPL-MCNC: 9.8 MG/DL (ref 8.6–10.2)
CARBON DIOXIDE, CO2: 27 MMOL/L (ref 22–29)
CHLORIDE: 101 MMOL/L (ref 98–107)
CREAT SERPL-MCNC: 0.8 MG/DL (ref 0.5–0.9)
CRP QUALITATIVE: POSITIVE MG/L
CRP QUANTITATIVE: 5.8 MG/L
DATE/TIME: NORMAL
DATE/TIME: NORMAL
GLOBULIN: 2.1 G/DL (ref 1.5–4.5)
GLUCOSE: 93 MG/DL (ref 74–106)
LAB PERSONNEL: NORMAL
LAB PERSONNEL: NORMAL
PERSON NOTIFIED/TITLE: NORMAL
PERSON NOTIFIED/TITLE: NORMAL
POTASSIUM: 3.9 MMOL/L (ref 3.5–5.1)
PROT SNV-MCNC: 6.6 G/DL (ref 6.4–8.3)
REASON FOR REJECTION: NORMAL
REASON FOR REJECTION: NORMAL
RESOLUTION: NORMAL
RESOLUTION: NORMAL
SODIUM: 138 MMOL/L (ref 136–145)
TEST UNABLE TO PERFORM: NORMAL
TEST UNABLE TO PERFORM: NORMAL
UREA NITROGEN (BUN): 11.9 MG/DL (ref 6–20)

## 2022-01-24 DIAGNOSIS — R74.8 ELEVATED LIVER ENZYMES: Primary | ICD-10-CM

## 2022-01-28 ENCOUNTER — CLINICAL SUPPORT (OUTPATIENT)
Dept: FAMILY MEDICINE | Facility: CLINIC | Age: 42
End: 2022-01-28
Payer: MEDICAID

## 2022-01-28 ENCOUNTER — TELEPHONE (OUTPATIENT)
Dept: FAMILY MEDICINE | Facility: CLINIC | Age: 42
End: 2022-01-28
Payer: MEDICAID

## 2022-01-28 DIAGNOSIS — R74.8 ELEVATED LIVER ENZYMES: ICD-10-CM

## 2022-01-28 NOTE — TELEPHONE ENCOUNTER
sabrina notified     ----- Message from Eugenio Awan MD sent at 1/24/2022  9:14 AM CST -----  lLet patient know:  Liver enzymes a littlte high. Is she taking nsaids, tylenol or drinking more etoh than normal?     Crp is a little high but better.    Let her know we need to repeat her cbc and esr. Will add a cmp to check her liver enzymes as well

## 2022-01-29 LAB
ABS NRBC COUNT: 0 X 10 3/UL (ref 0–0.01)
ABSOLUTE BASOPHIL: 0.06 X 10 3/UL (ref 0–0.22)
ABSOLUTE EOSINOPHIL: 0.2 X 10 3/UL (ref 0.04–0.54)
ABSOLUTE IMMATURE GRAN: 0.11 X 10 3/UL (ref 0–0.04)
ABSOLUTE LYMPHOCYTE: 2.35 X 10 3/UL (ref 0.86–4.75)
ABSOLUTE MONOCYTE: 0.54 X 10 3/UL (ref 0.22–1.08)
ALBUMIN SERPL-MCNC: 4.3 G/DL (ref 3.5–5.2)
ALBUMIN/GLOB SERPL ELPH: 1.7 {RATIO} (ref 1–2.7)
ALP ISOS SERPL LEV INH-CCNC: 93 U/L (ref 35–105)
ALT (SGPT): 95 U/L (ref 0–33)
ANION GAP SERPL CALC-SCNC: 14 MMOL/L (ref 8–17)
AST SERPL-CCNC: 59 U/L (ref 0–32)
BASOPHILS NFR BLD: 0.6 % (ref 0.2–1.2)
BILIRUBIN, TOTAL: 0.5 MG/DL (ref 0–1.2)
BUN/CREAT SERPL: 13.6 (ref 6–20)
CALCIUM SERPL-MCNC: 9.6 MG/DL (ref 8.6–10.2)
CARBON DIOXIDE, CO2: 24 MMOL/L (ref 22–29)
CHLORIDE: 100 MMOL/L (ref 98–107)
CREAT SERPL-MCNC: 0.85 MG/DL (ref 0.5–0.9)
EOSINOPHIL NFR BLD: 2.1 % (ref 0.7–7)
GFR ESTIMATION: 74.08
GLOBULIN: 2.5 G/DL (ref 1.5–4.5)
GLUCOSE: 103 MG/DL (ref 74–106)
HCT VFR BLD AUTO: 42.8 % (ref 37–47)
HGB BLD-MCNC: 14.3 G/DL (ref 12–16)
IMMATURE GRANULOCYTES: 1.1 % (ref 0–0.5)
LYMPHOCYTES NFR BLD: 24.4 % (ref 19.3–53.1)
MCH RBC QN AUTO: 28.4 PG (ref 27–32)
MCHC RBC AUTO-ENTMCNC: 33.4 G/DL (ref 32–36)
MCV RBC AUTO: 84.9 FL (ref 82–100)
MONOCYTES NFR BLD: 5.6 % (ref 4.7–12.5)
NEUTROPHILS # BLD AUTO: 6.39 X 10 3/UL (ref 2.15–7.56)
NEUTROPHILS NFR BLD: 66.2 % (ref 34–71.1)
NUCLEATED RED BLOOD CELLS: 0 /100 WBC (ref 0–0.2)
PLATELET # BLD AUTO: 277 X 10 3/UL (ref 135–400)
POTASSIUM: 4 MMOL/L (ref 3.5–5.1)
PROT SNV-MCNC: 6.8 G/DL (ref 6.4–8.3)
RBC # BLD AUTO: 5.04 X 10 6/UL (ref 4.2–5.4)
RDW-SD: 37.2 FL (ref 37–54)
SED RATE (WESTERGREN): 15 MM/HR (ref 0–20)
SODIUM: 138 MMOL/L (ref 136–145)
UREA NITROGEN (BUN): 11.6 MG/DL (ref 6–20)
WBC # BLD: 9.65 X 10 3/UL (ref 4.3–10.8)

## 2022-02-02 ENCOUNTER — TELEPHONE (OUTPATIENT)
Dept: FAMILY MEDICINE | Facility: CLINIC | Age: 42
End: 2022-02-02
Payer: MEDICAID

## 2022-02-02 NOTE — TELEPHONE ENCOUNTER
----- Message from Eugenio Awan MD sent at 1/31/2022 11:59 AM CST -----  Liver enzymes better. Esr normal.

## 2022-03-30 DIAGNOSIS — I10 HYPERTENSION, UNSPECIFIED TYPE: ICD-10-CM

## 2022-03-30 RX ORDER — AMLODIPINE BESYLATE 5 MG/1
5 TABLET ORAL DAILY
Qty: 30 TABLET | Refills: 5 | Status: SHIPPED | OUTPATIENT
Start: 2022-03-30 | End: 2022-07-15 | Stop reason: SDUPTHER

## 2022-04-29 ENCOUNTER — PATIENT MESSAGE (OUTPATIENT)
Dept: FAMILY MEDICINE | Facility: CLINIC | Age: 42
End: 2022-04-29
Payer: MEDICAID

## 2022-04-29 DIAGNOSIS — N30.90 CYSTITIS: Primary | ICD-10-CM

## 2022-04-29 RX ORDER — SULFAMETHOXAZOLE AND TRIMETHOPRIM 800; 160 MG/1; MG/1
1 TABLET ORAL 2 TIMES DAILY
Qty: 14 TABLET | Refills: 0 | Status: SHIPPED | OUTPATIENT
Start: 2022-04-29

## 2022-07-15 ENCOUNTER — OFFICE VISIT (OUTPATIENT)
Dept: FAMILY MEDICINE | Facility: CLINIC | Age: 42
End: 2022-07-15
Payer: MEDICAID

## 2022-07-15 VITALS
TEMPERATURE: 98 F | DIASTOLIC BLOOD PRESSURE: 84 MMHG | HEART RATE: 86 BPM | OXYGEN SATURATION: 98 % | BODY MASS INDEX: 36.86 KG/M2 | HEIGHT: 66 IN | RESPIRATION RATE: 20 BRPM | SYSTOLIC BLOOD PRESSURE: 134 MMHG | WEIGHT: 229.38 LBS

## 2022-07-15 DIAGNOSIS — Z30.9 ENCOUNTER FOR CONTRACEPTIVE MANAGEMENT, UNSPECIFIED TYPE: ICD-10-CM

## 2022-07-15 DIAGNOSIS — E78.5 HYPERLIPIDEMIA, UNSPECIFIED HYPERLIPIDEMIA TYPE: ICD-10-CM

## 2022-07-15 DIAGNOSIS — Z00.00 WELLNESS EXAMINATION: Primary | ICD-10-CM

## 2022-07-15 DIAGNOSIS — Z79.899 ON LONG TERM DRUG THERAPY: ICD-10-CM

## 2022-07-15 DIAGNOSIS — M19.90 ARTHRITIS: ICD-10-CM

## 2022-07-15 DIAGNOSIS — I10 HYPERTENSION, UNSPECIFIED TYPE: ICD-10-CM

## 2022-07-15 DIAGNOSIS — D64.9 ANEMIA, UNSPECIFIED TYPE: ICD-10-CM

## 2022-07-15 DIAGNOSIS — K21.9 GASTROESOPHAGEAL REFLUX DISEASE, UNSPECIFIED WHETHER ESOPHAGITIS PRESENT: ICD-10-CM

## 2022-07-15 PROCEDURE — 1159F MED LIST DOCD IN RCRD: CPT | Mod: CPTII,S$GLB,, | Performed by: FAMILY MEDICINE

## 2022-07-15 PROCEDURE — 3075F SYST BP GE 130 - 139MM HG: CPT | Mod: CPTII,S$GLB,, | Performed by: FAMILY MEDICINE

## 2022-07-15 PROCEDURE — 1159F PR MEDICATION LIST DOCUMENTED IN MEDICAL RECORD: ICD-10-PCS | Mod: CPTII,S$GLB,, | Performed by: FAMILY MEDICINE

## 2022-07-15 PROCEDURE — 3079F PR MOST RECENT DIASTOLIC BLOOD PRESSURE 80-89 MM HG: ICD-10-PCS | Mod: CPTII,S$GLB,, | Performed by: FAMILY MEDICINE

## 2022-07-15 PROCEDURE — 3079F DIAST BP 80-89 MM HG: CPT | Mod: CPTII,S$GLB,, | Performed by: FAMILY MEDICINE

## 2022-07-15 PROCEDURE — 3008F PR BODY MASS INDEX (BMI) DOCUMENTED: ICD-10-PCS | Mod: CPTII,S$GLB,, | Performed by: FAMILY MEDICINE

## 2022-07-15 PROCEDURE — 3008F BODY MASS INDEX DOCD: CPT | Mod: CPTII,S$GLB,, | Performed by: FAMILY MEDICINE

## 2022-07-15 PROCEDURE — 99396 PR PREVENTIVE VISIT,EST,40-64: ICD-10-PCS | Mod: S$GLB,,, | Performed by: FAMILY MEDICINE

## 2022-07-15 PROCEDURE — 3075F PR MOST RECENT SYSTOLIC BLOOD PRESS GE 130-139MM HG: ICD-10-PCS | Mod: CPTII,S$GLB,, | Performed by: FAMILY MEDICINE

## 2022-07-15 PROCEDURE — 99396 PREV VISIT EST AGE 40-64: CPT | Mod: S$GLB,,, | Performed by: FAMILY MEDICINE

## 2022-07-15 RX ORDER — PANTOPRAZOLE SODIUM 40 MG/1
40 TABLET, DELAYED RELEASE ORAL DAILY
Qty: 30 TABLET | Refills: 3 | Status: SHIPPED | OUTPATIENT
Start: 2022-07-15 | End: 2022-12-23 | Stop reason: SDUPTHER

## 2022-07-15 RX ORDER — ETONOGESTREL AND ETHINYL ESTRADIOL VAGINAL RING .015; .12 MG/D; MG/D
1 RING VAGINAL
Qty: 1 EACH | Refills: 11 | Status: SHIPPED | OUTPATIENT
Start: 2022-07-15 | End: 2022-12-23 | Stop reason: SDUPTHER

## 2022-07-15 RX ORDER — METOPROLOL SUCCINATE 100 MG/1
100 TABLET, EXTENDED RELEASE ORAL DAILY
Qty: 30 TABLET | Refills: 5 | Status: SHIPPED | OUTPATIENT
Start: 2022-07-15 | End: 2022-12-23 | Stop reason: SDUPTHER

## 2022-07-15 RX ORDER — AMLODIPINE BESYLATE 5 MG/1
5 TABLET ORAL DAILY
Qty: 30 TABLET | Refills: 5 | Status: SHIPPED | OUTPATIENT
Start: 2022-07-15 | End: 2022-12-23 | Stop reason: SDUPTHER

## 2022-07-15 RX ORDER — HYDROCHLOROTHIAZIDE 25 MG/1
25 TABLET ORAL DAILY
Qty: 30 TABLET | Refills: 5 | Status: SHIPPED | OUTPATIENT
Start: 2022-07-15 | End: 2022-12-23 | Stop reason: SDUPTHER

## 2022-07-15 NOTE — PROGRESS NOTES
Subjective:      Patient ID: Breanna Dawson is a 41 y.o. female.    Chief Complaint: Follow-up      HPI:  41-year-old female presents for wellness exam.  Reports compliance with meds.  She does have a rash on bilateral feet.  Still has intermittent swelling in her hands.  She does feel like the swelling is worse whenever she gets stressed out.    Past Medical History:   Diagnosis Date    Hypertension      Past Surgical History:   Procedure Laterality Date    CHOLECYSTECTOMY       Family History   Problem Relation Age of Onset    Hypertension Mother     Hypertension Brother      Social History     Socioeconomic History    Marital status:    Tobacco Use    Smoking status: Current Some Day Smoker     Types: Cigarettes    Smokeless tobacco: Never Used   Substance and Sexual Activity    Alcohol use: No    Drug use: No     Social Determinants of Health     Financial Resource Strain: Low Risk     Difficulty of Paying Living Expenses: Not hard at all   Food Insecurity: No Food Insecurity    Worried About Running Out of Food in the Last Year: Never true    Ran Out of Food in the Last Year: Never true   Transportation Needs: No Transportation Needs    Lack of Transportation (Medical): No    Lack of Transportation (Non-Medical): No   Physical Activity: Insufficiently Active    Days of Exercise per Week: 2 days    Minutes of Exercise per Session: 30 min   Stress: No Stress Concern Present    Feeling of Stress : Not at all   Social Connections: Unknown    Frequency of Communication with Friends and Family: Three times a week    Frequency of Social Gatherings with Friends and Family: Twice a week    Active Member of Clubs or Organizations: Yes    Attends Club or Organization Meetings: More than 4 times per year    Marital Status:    Housing Stability: Unknown    Unable to Pay for Housing in the Last Year: No    Unstable Housing in the Last Year: No     Review of patient's allergies  "indicates:   Allergen Reactions    Procardia [nifedipine] Nausea And Vomiting     Burning for the inside out       Review of Systems   Constitutional: Negative for activity change, appetite change, chills, fatigue and fever.   HENT: Negative for congestion, ear pain, postnasal drip, rhinorrhea, sinus pressure, sinus pain and sore throat.    Eyes: Negative for pain and redness.   Respiratory: Negative for cough, chest tightness and shortness of breath.    Cardiovascular: Negative for chest pain and leg swelling.   Gastrointestinal: Negative for abdominal distention, abdominal pain, constipation, diarrhea, nausea and vomiting.   Endocrine: Negative for cold intolerance and heat intolerance.   Genitourinary: Negative for dysuria, frequency and hematuria.   Musculoskeletal: Positive for arthralgias. Negative for back pain and joint swelling.   Skin: Positive for rash. Negative for pallor.   Neurological: Negative for dizziness and light-headedness.   Psychiatric/Behavioral: Negative for agitation, decreased concentration and hallucinations. The patient is not nervous/anxious.        Objective:       /84   Pulse 86   Temp 98.2 °F (36.8 °C)   Resp 20   Ht 5' 6" (1.676 m)   Wt 104.1 kg (229 lb 6.4 oz)   SpO2 98%   BMI 37.03 kg/m²   Physical Exam  Constitutional:       Appearance: She is well-developed.   HENT:      Head: Normocephalic and atraumatic.      Nose: Nose normal.   Eyes:      Conjunctiva/sclera: Conjunctivae normal.      Pupils: Pupils are equal, round, and reactive to light.   Cardiovascular:      Rate and Rhythm: Normal rate and regular rhythm.      Heart sounds: Normal heart sounds.   Pulmonary:      Effort: Pulmonary effort is normal.      Breath sounds: Normal breath sounds.   Abdominal:      Palpations: Abdomen is soft.   Musculoskeletal:         General: Normal range of motion.      Cervical back: Normal range of motion and neck supple.   Skin:     General: Skin is warm and dry.      " Findings: Rash present.   Neurological:      Mental Status: She is alert and oriented to person, place, and time.   Psychiatric:         Behavior: Behavior normal.         Thought Content: Thought content normal.         Assessment:     1. Wellness examination    2. Hypertension, unspecified type    3. Hyperlipidemia, unspecified hyperlipidemia type    4. Anemia, unspecified type    5. Arthritis    6. On long term drug therapy    7. Encounter for contraceptive management, unspecified type    8. Gastroesophageal reflux disease, unspecified whether esophagitis present        Plan:   Wellness examination    Hypertension, unspecified type  -     TSH and Free T4; Future; Expected date: 07/15/2022  -     amLODIPine (NORVASC) 5 MG tablet; Take 1 tablet (5 mg total) by mouth once daily.  Dispense: 30 tablet; Refill: 5  -     hydroCHLOROthiazide (HYDRODIURIL) 25 MG tablet; Take 1 tablet (25 mg total) by mouth once daily.  Dispense: 30 tablet; Refill: 5  -     metoprolol succinate (TOPROL-XL) 100 MG 24 hr tablet; Take 1 tablet (100 mg total) by mouth once daily.  Dispense: 30 tablet; Refill: 5    Hyperlipidemia, unspecified hyperlipidemia type  -     Comprehensive Metabolic Panel; Future; Expected date: 07/15/2022  -     Lipid panel; Future; Expected date: 07/15/2022    Anemia, unspecified type  -     CBC Auto Differential; Future; Expected date: 07/15/2022    Arthritis  -     Sedimentation rate; Future; Expected date: 07/15/2022  -     C-reactive protein; Future; Expected date: 07/15/2022  -     LETTY IFA, w/Rflx to Pattern/Titer; Future; Expected date: 07/15/2022    On long term drug therapy  -     Hemoglobin A1c; Future; Expected date: 07/15/2022    Encounter for contraceptive management, unspecified type  -     etonogestreL-ethinyl estradioL (NUVARING) 0.12-0.015 mg/24 hr vaginal ring; Place 1 each vaginally every 28 days.  Dispense: 1 each; Refill: 11    Gastroesophageal reflux disease, unspecified whether esophagitis  present  -     pantoprazole (PROTONIX) 40 MG tablet; Take 1 tablet (40 mg total) by mouth once daily.  Dispense: 30 tablet; Refill: 3      Continue current meds.    Consider referral to Rheumatology based on lab findings.    Follow-up in 6 months.  Sooner if needed    Medication List with Changes/Refills   Current Medications    CLOBETASOL (TEMOVATE) 0.05 % CREAM    Apply topically 2 (two) times daily.    SULFAMETHOXAZOLE-TRIMETHOPRIM 800-160MG (BACTRIM DS) 800-160 MG TAB    Take 1 tablet by mouth 2 (two) times daily.    VALACYCLOVIR (VALTREX) 1000 MG TABLET    TK 2 TS PO Q 12 H   Changed and/or Refilled Medications    Modified Medication Previous Medication    AMLODIPINE (NORVASC) 5 MG TABLET amLODIPine (NORVASC) 5 MG tablet       Take 1 tablet (5 mg total) by mouth once daily.    Take 1 tablet (5 mg total) by mouth once daily.    ETONOGESTREL-ETHINYL ESTRADIOL (NUVARING) 0.12-0.015 MG/24 HR VAGINAL RING etonogestreL-ethinyl estradioL (NUVARING) 0.12-0.015 mg/24 hr vaginal ring       Place 1 each vaginally every 28 days.    Place 1 each vaginally every 28 days.    HYDROCHLOROTHIAZIDE (HYDRODIURIL) 25 MG TABLET hydroCHLOROthiazide (HYDRODIURIL) 25 MG tablet       Take 1 tablet (25 mg total) by mouth once daily.    Take 1 tablet (25 mg total) by mouth once daily.    METOPROLOL SUCCINATE (TOPROL-XL) 100 MG 24 HR TABLET metoprolol succinate (TOPROL-XL) 100 MG 24 hr tablet       Take 1 tablet (100 mg total) by mouth once daily.    Take 1 tablet (100 mg total) by mouth once daily.    PANTOPRAZOLE (PROTONIX) 40 MG TABLET pantoprazole (PROTONIX) 40 MG tablet       Take 1 tablet (40 mg total) by mouth once daily.    TAKE 1 TABLET BY MOUTH EVERY DAY            Disclaimer: This note may have been prepared using voice recognition software, it may have not been extensively proofed, as such there could be errors within the text such as sound alike errors.

## 2022-07-19 LAB
ALBUMIN SERPL-MCNC: 4.2 G/DL (ref 3.5–5.2)
ALBUMIN/GLOB SERPL ELPH: 1.8 {RATIO} (ref 1–2.7)
ALP ISOS SERPL LEV INH-CCNC: 84 U/L (ref 35–105)
ALT (SGPT): 50 U/L (ref 0–33)
ANA SER-ACNC: NEGATIVE
ANION GAP SERPL CALC-SCNC: 15 MMOL/L (ref 8–17)
AST SERPL-CCNC: 52 U/L (ref 0–32)
BILIRUBIN, TOTAL: <0.15 MG/DL (ref 0–1.2)
BUN/CREAT SERPL: 13.3 (ref 6–20)
CALCIUM SERPL-MCNC: 8.9 MG/DL (ref 8.6–10.2)
CARBON DIOXIDE, CO2: 21 MMOL/L (ref 22–29)
CHLORIDE: 105 MMOL/L (ref 98–107)
CHOLEST SERPL-MSCNC: 262 MG/DL (ref 100–200)
CREAT SERPL-MCNC: 0.83 MG/DL (ref 0.5–0.9)
CRP QUALITATIVE: POSITIVE MG/L
CRP QUANTITATIVE: 7.9 MG/L
DATE/TIME: NORMAL
DATE/TIME: NORMAL
GFR ESTIMATION: 76.14
GLOBULIN: 2.4 G/DL (ref 1.5–4.5)
GLUCOSE: 99 MG/DL (ref 74–106)
HDLC SERPL-MCNC: 59 MG/DL
LAB PERSONNEL: NORMAL
LAB PERSONNEL: NORMAL
LDL/HDL RATIO: 3 (ref 1–3)
LDLC SERPL CALC-MCNC: 175.2 MG/DL (ref 0–100)
PERSON NOTIFIED/TITLE: NORMAL
PERSON NOTIFIED/TITLE: NORMAL
POTASSIUM: 4.2 MMOL/L (ref 3.5–5.1)
PROT SNV-MCNC: 6.6 G/DL (ref 6.4–8.3)
REASON FOR REJECTION: NORMAL
REASON FOR REJECTION: NORMAL
RESOLUTION: NORMAL
RESOLUTION: NORMAL
SODIUM: 141 MMOL/L (ref 136–145)
T4, FREE: 1.18 NG/DL (ref 0.93–1.7)
TEST UNABLE TO PERFORM: NORMAL
TEST UNABLE TO PERFORM: NORMAL
TRIGL SERPL-MCNC: 139 MG/DL (ref 0–150)
TSH SERPL DL<=0.005 MIU/L-ACNC: 0.61 UIU/ML (ref 0.27–4.2)
UREA NITROGEN (BUN): 11 MG/DL (ref 6–20)

## 2022-07-20 ENCOUNTER — PATIENT MESSAGE (OUTPATIENT)
Dept: FAMILY MEDICINE | Facility: CLINIC | Age: 42
End: 2022-07-20
Payer: MEDICAID

## 2022-10-02 ENCOUNTER — PATIENT MESSAGE (OUTPATIENT)
Dept: FAMILY MEDICINE | Facility: CLINIC | Age: 42
End: 2022-10-02
Payer: MEDICAID

## 2022-10-03 DIAGNOSIS — Z12.39 ENCOUNTER FOR SCREENING FOR MALIGNANT NEOPLASM OF BREAST, UNSPECIFIED SCREENING MODALITY: Primary | ICD-10-CM

## 2022-10-26 ENCOUNTER — PATIENT MESSAGE (OUTPATIENT)
Dept: FAMILY MEDICINE | Facility: CLINIC | Age: 42
End: 2022-10-26
Payer: MEDICAID

## 2022-11-03 DIAGNOSIS — B00.1 RECURRENT HERPES LABIALIS: ICD-10-CM

## 2022-11-04 RX ORDER — VALACYCLOVIR HYDROCHLORIDE 1 G/1
TABLET, FILM COATED ORAL
Qty: 4 TABLET | Refills: 5 | Status: SHIPPED | OUTPATIENT
Start: 2022-11-04 | End: 2022-12-23 | Stop reason: SDUPTHER

## 2022-12-06 ENCOUNTER — PATIENT MESSAGE (OUTPATIENT)
Dept: FAMILY MEDICINE | Facility: CLINIC | Age: 42
End: 2022-12-06
Payer: MEDICAID

## 2022-12-21 LAB — BCS RECOMMENDATION EXT: NORMAL

## 2022-12-22 ENCOUNTER — PATIENT OUTREACH (OUTPATIENT)
Dept: ADMINISTRATIVE | Facility: HOSPITAL | Age: 42
End: 2022-12-22
Payer: MEDICAID

## 2022-12-23 ENCOUNTER — OFFICE VISIT (OUTPATIENT)
Dept: FAMILY MEDICINE | Facility: CLINIC | Age: 42
End: 2022-12-23
Payer: MEDICAID

## 2022-12-23 VITALS
OXYGEN SATURATION: 99 % | SYSTOLIC BLOOD PRESSURE: 130 MMHG | RESPIRATION RATE: 16 BRPM | DIASTOLIC BLOOD PRESSURE: 88 MMHG | HEART RATE: 78 BPM

## 2022-12-23 DIAGNOSIS — Z79.899 ON LONG TERM DRUG THERAPY: ICD-10-CM

## 2022-12-23 DIAGNOSIS — Z30.9 ENCOUNTER FOR CONTRACEPTIVE MANAGEMENT, UNSPECIFIED TYPE: ICD-10-CM

## 2022-12-23 DIAGNOSIS — R70.0 ESR RAISED: ICD-10-CM

## 2022-12-23 DIAGNOSIS — D64.9 ANEMIA, UNSPECIFIED TYPE: ICD-10-CM

## 2022-12-23 DIAGNOSIS — B00.1 RECURRENT HERPES LABIALIS: ICD-10-CM

## 2022-12-23 DIAGNOSIS — E78.5 HYPERLIPIDEMIA, UNSPECIFIED HYPERLIPIDEMIA TYPE: Primary | ICD-10-CM

## 2022-12-23 DIAGNOSIS — I10 HYPERTENSION, UNSPECIFIED TYPE: ICD-10-CM

## 2022-12-23 DIAGNOSIS — K21.9 GASTROESOPHAGEAL REFLUX DISEASE, UNSPECIFIED WHETHER ESOPHAGITIS PRESENT: ICD-10-CM

## 2022-12-23 PROCEDURE — 3075F PR MOST RECENT SYSTOLIC BLOOD PRESS GE 130-139MM HG: ICD-10-PCS | Mod: CPTII,S$GLB,, | Performed by: FAMILY MEDICINE

## 2022-12-23 PROCEDURE — 3079F DIAST BP 80-89 MM HG: CPT | Mod: CPTII,S$GLB,, | Performed by: FAMILY MEDICINE

## 2022-12-23 PROCEDURE — 3079F PR MOST RECENT DIASTOLIC BLOOD PRESSURE 80-89 MM HG: ICD-10-PCS | Mod: CPTII,S$GLB,, | Performed by: FAMILY MEDICINE

## 2022-12-23 PROCEDURE — 99214 OFFICE O/P EST MOD 30 MIN: CPT | Mod: S$GLB,,, | Performed by: FAMILY MEDICINE

## 2022-12-23 PROCEDURE — 3075F SYST BP GE 130 - 139MM HG: CPT | Mod: CPTII,S$GLB,, | Performed by: FAMILY MEDICINE

## 2022-12-23 PROCEDURE — 99214 PR OFFICE/OUTPT VISIT, EST, LEVL IV, 30-39 MIN: ICD-10-PCS | Mod: S$GLB,,, | Performed by: FAMILY MEDICINE

## 2022-12-23 RX ORDER — HYDROCHLOROTHIAZIDE 25 MG/1
25 TABLET ORAL DAILY
Qty: 30 TABLET | Refills: 5 | Status: SHIPPED | OUTPATIENT
Start: 2022-12-23 | End: 2023-08-01

## 2022-12-23 RX ORDER — PANTOPRAZOLE SODIUM 40 MG/1
40 TABLET, DELAYED RELEASE ORAL DAILY
Qty: 30 TABLET | Refills: 5 | Status: SHIPPED | OUTPATIENT
Start: 2022-12-23

## 2022-12-23 RX ORDER — AMLODIPINE BESYLATE 5 MG/1
5 TABLET ORAL DAILY
Qty: 30 TABLET | Refills: 5 | Status: SHIPPED | OUTPATIENT
Start: 2022-12-23 | End: 2023-12-23

## 2022-12-23 RX ORDER — VALACYCLOVIR HYDROCHLORIDE 500 MG/1
500 TABLET, FILM COATED ORAL DAILY
Qty: 30 TABLET | Refills: 11 | Status: SHIPPED | OUTPATIENT
Start: 2022-12-23

## 2022-12-23 RX ORDER — METOPROLOL SUCCINATE 100 MG/1
100 TABLET, EXTENDED RELEASE ORAL DAILY
Qty: 30 TABLET | Refills: 5 | Status: SHIPPED | OUTPATIENT
Start: 2022-12-23

## 2022-12-23 RX ORDER — ETONOGESTREL AND ETHINYL ESTRADIOL VAGINAL RING .015; .12 MG/D; MG/D
1 RING VAGINAL
Qty: 1 EACH | Refills: 11 | Status: SHIPPED | OUTPATIENT
Start: 2022-12-23 | End: 2023-05-08 | Stop reason: SDUPTHER

## 2022-12-23 NOTE — PROGRESS NOTES
Subjective:      Patient ID: Breanna Dawson is a 42 y.o. female.    Chief Complaint: No chief complaint on file.      HPI:  42-year-old female who presents for chronic medical management.  Doing well with current meds.  She started a new job.  Spends more time with her kids.  She does feel less stressed.  She has started having frequent fever blisters.  She is interested in medicine to cut back on frequency.  Otherwise doing great was exposed to mono.  No symptoms.  She did have a lymph node on the left side of her neck.  Was swollen.  Seems to be getting better.    Past Medical History:   Diagnosis Date    Hypertension      Past Surgical History:   Procedure Laterality Date    CHOLECYSTECTOMY       Family History   Problem Relation Age of Onset    Hypertension Mother     Hypertension Brother      Social History     Socioeconomic History    Marital status:    Tobacco Use    Smoking status: Some Days     Types: Cigarettes    Smokeless tobacco: Never   Substance and Sexual Activity    Alcohol use: No    Drug use: No     Social Determinants of Health     Financial Resource Strain: Low Risk     Difficulty of Paying Living Expenses: Not hard at all   Food Insecurity: No Food Insecurity    Worried About Running Out of Food in the Last Year: Never true    Ran Out of Food in the Last Year: Never true   Transportation Needs: No Transportation Needs    Lack of Transportation (Medical): No    Lack of Transportation (Non-Medical): No   Physical Activity: Insufficiently Active    Days of Exercise per Week: 2 days    Minutes of Exercise per Session: 20 min   Stress: No Stress Concern Present    Feeling of Stress : Only a little   Social Connections: Unknown    Frequency of Communication with Friends and Family: More than three times a week    Frequency of Social Gatherings with Friends and Family: Three times a week    Active Member of Clubs or Organizations: Yes    Attends Club or Organization Meetings: More than 4  times per year    Marital Status:    Housing Stability: Unknown    Unable to Pay for Housing in the Last Year: No    Unstable Housing in the Last Year: No     Review of patient's allergies indicates:   Allergen Reactions    Procardia [nifedipine] Nausea And Vomiting     Burning for the inside out       Review of Systems   Constitutional:  Negative for activity change, appetite change, chills, fatigue and fever.   HENT:  Negative for congestion, ear pain, postnasal drip, rhinorrhea, sinus pressure, sinus pain and sore throat.    Eyes:  Negative for pain and redness.   Respiratory:  Negative for cough, chest tightness and shortness of breath.    Cardiovascular:  Negative for chest pain and leg swelling.   Gastrointestinal:  Negative for abdominal distention, abdominal pain, constipation, diarrhea, nausea and vomiting.   Endocrine: Negative for cold intolerance and heat intolerance.   Genitourinary:  Negative for dysuria, frequency and hematuria.   Musculoskeletal:  Negative for arthralgias, back pain and joint swelling.   Skin:  Positive for wound. Negative for pallor.   Neurological:  Negative for dizziness and light-headedness.   Psychiatric/Behavioral:  Negative for agitation, decreased concentration and hallucinations. The patient is not nervous/anxious.      Objective:       /88   Pulse 78   Resp 16   SpO2 99%   Physical Exam  Vitals and nursing note reviewed.   Constitutional:       Appearance: She is well-developed.   HENT:      Head: Normocephalic and atraumatic.      Nose: Nose normal.      Comments: Fever blister lip  Eyes:      Conjunctiva/sclera: Conjunctivae normal.      Pupils: Pupils are equal, round, and reactive to light.   Cardiovascular:      Rate and Rhythm: Normal rate and regular rhythm.      Heart sounds: Normal heart sounds.   Pulmonary:      Effort: Pulmonary effort is normal.      Breath sounds: Normal breath sounds.   Abdominal:      Palpations: Abdomen is soft.       Tenderness: There is no abdominal tenderness.   Musculoskeletal:         General: Normal range of motion.      Cervical back: Normal range of motion and neck supple.   Skin:     General: Skin is warm and dry.   Neurological:      Mental Status: She is alert and oriented to person, place, and time.   Psychiatric:         Behavior: Behavior normal.         Thought Content: Thought content normal.         Judgment: Judgment normal.       Assessment:     1. Hyperlipidemia, unspecified hyperlipidemia type    2. Hypertension, unspecified type    3. Encounter for contraceptive management, unspecified type    4. Gastroesophageal reflux disease, unspecified whether esophagitis present    5. Recurrent herpes labialis    6. Anemia, unspecified type    7. ESR raised    8. On long term drug therapy        Plan:   Hyperlipidemia, unspecified hyperlipidemia type    Hypertension, unspecified type  -     amLODIPine (NORVASC) 5 MG tablet; Take 1 tablet (5 mg total) by mouth once daily.  Dispense: 30 tablet; Refill: 5  -     hydroCHLOROthiazide (HYDRODIURIL) 25 MG tablet; Take 1 tablet (25 mg total) by mouth once daily.  Dispense: 30 tablet; Refill: 5  -     metoprolol succinate (TOPROL-XL) 100 MG 24 hr tablet; Take 1 tablet (100 mg total) by mouth once daily.  Dispense: 30 tablet; Refill: 5  -     TSH; Future; Expected date: 12/23/2022  -     T4, Free; Future; Expected date: 12/23/2022  -     Comprehensive Metabolic Panel; Future; Expected date: 12/23/2022  -     Lipid Panel; Future; Expected date: 12/23/2022    Encounter for contraceptive management, unspecified type  -     etonogestreL-ethinyl estradioL (NUVARING) 0.12-0.015 mg/24 hr vaginal ring; Place 1 each vaginally every 28 days.  Dispense: 1 each; Refill: 11    Gastroesophageal reflux disease, unspecified whether esophagitis present  -     pantoprazole (PROTONIX) 40 MG tablet; Take 1 tablet (40 mg total) by mouth once daily.  Dispense: 30 tablet; Refill: 5    Recurrent herpes  labialis  -     valACYclovir (VALTREX) 500 MG tablet; Take 1 tablet (500 mg total) by mouth once daily.  Dispense: 30 tablet; Refill: 11    Anemia, unspecified type    ESR raised  -     C-reactive protein; Future; Expected date: 12/23/2022  -     Sedimentation rate; Future; Expected date: 12/23/2022    On long term drug therapy  -     Hemoglobin A1C; Future; Expected date: 12/23/2022  -     CBC Auto Differential; Future; Expected date: 12/23/2022      Slightly enlarged lymph node left anterior cervical.  Improving.  Will monitor.  If it continues will plan to order ultrasound.      Will come get labs at later date.  No electricity at time of eval    Start prophylactic Valtrex.      Follow-up in 6 months.  Sooner if needed.    Medication List with Changes/Refills   Current Medications    CLOBETASOL (TEMOVATE) 0.05 % CREAM    Apply topically 2 (two) times daily.    SULFAMETHOXAZOLE-TRIMETHOPRIM 800-160MG (BACTRIM DS) 800-160 MG TAB    Take 1 tablet by mouth 2 (two) times daily.   Changed and/or Refilled Medications    Modified Medication Previous Medication    AMLODIPINE (NORVASC) 5 MG TABLET amLODIPine (NORVASC) 5 MG tablet       Take 1 tablet (5 mg total) by mouth once daily.    Take 1 tablet (5 mg total) by mouth once daily.    ETONOGESTREL-ETHINYL ESTRADIOL (NUVARING) 0.12-0.015 MG/24 HR VAGINAL RING etonogestreL-ethinyl estradioL (NUVARING) 0.12-0.015 mg/24 hr vaginal ring       Place 1 each vaginally every 28 days.    Place 1 each vaginally every 28 days.    HYDROCHLOROTHIAZIDE (HYDRODIURIL) 25 MG TABLET hydroCHLOROthiazide (HYDRODIURIL) 25 MG tablet       Take 1 tablet (25 mg total) by mouth once daily.    Take 1 tablet (25 mg total) by mouth once daily.    METOPROLOL SUCCINATE (TOPROL-XL) 100 MG 24 HR TABLET metoprolol succinate (TOPROL-XL) 100 MG 24 hr tablet       Take 1 tablet (100 mg total) by mouth once daily.    Take 1 tablet (100 mg total) by mouth once daily.    PANTOPRAZOLE (PROTONIX) 40 MG TABLET  pantoprazole (PROTONIX) 40 MG tablet       Take 1 tablet (40 mg total) by mouth once daily.    Take 1 tablet (40 mg total) by mouth once daily.    VALACYCLOVIR (VALTREX) 500 MG TABLET valACYclovir (VALTREX) 1000 MG tablet       Take 1 tablet (500 mg total) by mouth once daily.    TK 2 TS PO Q 12 H            Disclaimer: This note may have been prepared using voice recognition software, it may have not been extensively proofed, as such there could be errors within the text such as sound alike errors.

## 2022-12-30 ENCOUNTER — CLINICAL SUPPORT (OUTPATIENT)
Dept: FAMILY MEDICINE | Facility: CLINIC | Age: 42
End: 2022-12-30
Payer: MEDICAID

## 2022-12-30 DIAGNOSIS — Z79.899 ON LONG TERM DRUG THERAPY: ICD-10-CM

## 2022-12-30 DIAGNOSIS — Z20.828 EBV EXPOSURE: Primary | ICD-10-CM

## 2022-12-30 DIAGNOSIS — I10 HYPERTENSION, UNSPECIFIED TYPE: ICD-10-CM

## 2022-12-30 DIAGNOSIS — R70.0 ESR RAISED: ICD-10-CM

## 2022-12-30 LAB
ABS NRBC COUNT: 0 X 10 3/UL (ref 0–0.01)
ABSOLUTE BASOPHIL: 0.05 X 10 3/UL (ref 0–0.22)
ABSOLUTE EOSINOPHIL: 0.2 X 10 3/UL (ref 0.04–0.54)
ABSOLUTE IMMATURE GRAN: 0.05 X 10 3/UL (ref 0–0.04)
ABSOLUTE LYMPHOCYTE: 2.38 X 10 3/UL (ref 0.86–4.75)
ABSOLUTE MONOCYTE: 0.55 X 10 3/UL (ref 0.22–1.08)
ALBUMIN SERPL-MCNC: 4.1 G/DL (ref 3.5–5.2)
ALBUMIN/GLOB SERPL ELPH: 1.6 {RATIO} (ref 1–2.7)
ALP ISOS SERPL LEV INH-CCNC: 100 U/L (ref 35–105)
ALT (SGPT): 37 U/L (ref 0–33)
ANION GAP SERPL CALC-SCNC: 14 MMOL/L (ref 8–17)
AST SERPL-CCNC: 32 U/L (ref 0–32)
BASOPHILS NFR BLD: 0.5 % (ref 0.2–1.2)
BILIRUBIN, TOTAL: 0.3 MG/DL (ref 0–1.2)
BUN/CREAT SERPL: 16.4 (ref 6–20)
CALCIUM SERPL-MCNC: 9.1 MG/DL (ref 8.6–10.2)
CARBON DIOXIDE, CO2: 23 MMOL/L (ref 22–29)
CHLORIDE: 99 MMOL/L (ref 98–107)
CHOLEST SERPL-MSCNC: 241 MG/DL (ref 100–200)
CREAT SERPL-MCNC: 0.75 MG/DL (ref 0.5–0.9)
CRP QUALITATIVE: POSITIVE MG/L
CRP QUANTITATIVE: 6.1 MG/L
EOSINOPHIL NFR BLD: 2 % (ref 0.7–7)
ESTIMATED AVERAGE GLUCOSE: 118 MG/DL
GFR ESTIMATION: 102.51
GLOBULIN: 2.5 G/DL (ref 1.5–4.5)
GLUCOSE: 95 MG/DL (ref 74–106)
HBA1C MFR BLD: 5.7 % (ref 4–6)
HCT VFR BLD AUTO: 36.6 % (ref 37–47)
HDLC SERPL-MCNC: 57 MG/DL
HGB BLD-MCNC: 11.7 G/DL (ref 12–16)
IMMATURE GRANULOCYTES: 0.5 % (ref 0–0.5)
LDL/HDL RATIO: 2.7 (ref 1–3)
LDLC SERPL CALC-MCNC: 151.4 MG/DL (ref 0–100)
LYMPHOCYTES NFR BLD: 23.8 % (ref 19.3–53.1)
MCH RBC QN AUTO: 25.5 PG (ref 27–32)
MCHC RBC AUTO-ENTMCNC: 32 G/DL (ref 32–36)
MCV RBC AUTO: 79.7 FL (ref 82–100)
MONOCYTES NFR BLD: 5.5 % (ref 4.7–12.5)
NEUTROPHILS # BLD AUTO: 6.79 X 10 3/UL (ref 2.15–7.56)
NEUTROPHILS NFR BLD: 67.7 % (ref 34–71.1)
NUCLEATED RED BLOOD CELLS: 0 /100 WBC (ref 0–0.2)
PLATELET # BLD AUTO: 303 X 10 3/UL (ref 135–400)
POTASSIUM: 4 MMOL/L (ref 3.5–5.1)
PROT SNV-MCNC: 6.6 G/DL (ref 6.4–8.3)
RBC # BLD AUTO: 4.59 X 10 6/UL (ref 4.2–5.4)
RDW-SD: 42.2 FL (ref 37–54)
SED RATE (WESTERGREN): 19 MM/HR (ref 0–20)
SODIUM: 136 MMOL/L (ref 136–145)
T4, FREE: 0.98 NG/DL (ref 0.93–1.7)
TRIGL SERPL-MCNC: 163 MG/DL (ref 0–150)
TSH SERPL DL<=0.005 MIU/L-ACNC: 1.67 UIU/ML (ref 0.27–4.2)
UREA NITROGEN (BUN): 12.3 MG/DL (ref 6–20)
WBC # BLD: 10.02 X 10 3/UL (ref 4.3–10.8)

## 2023-03-24 ENCOUNTER — PATIENT MESSAGE (OUTPATIENT)
Dept: FAMILY MEDICINE | Facility: CLINIC | Age: 43
End: 2023-03-24

## 2023-03-27 DIAGNOSIS — E88.819 INSULIN RESISTANCE: Primary | ICD-10-CM

## 2023-03-27 RX ORDER — METFORMIN HYDROCHLORIDE 500 MG/1
500 TABLET, EXTENDED RELEASE ORAL
Qty: 90 TABLET | Refills: 3 | Status: SHIPPED | OUTPATIENT
Start: 2023-03-27 | End: 2024-03-26

## 2023-04-26 ENCOUNTER — PATIENT OUTREACH (OUTPATIENT)
Dept: ADMINISTRATIVE | Facility: HOSPITAL | Age: 43
End: 2023-04-26
Payer: MEDICAID

## 2023-05-08 DIAGNOSIS — Z30.9 ENCOUNTER FOR CONTRACEPTIVE MANAGEMENT, UNSPECIFIED TYPE: ICD-10-CM

## 2023-05-08 RX ORDER — ETONOGESTREL AND ETHINYL ESTRADIOL VAGINAL RING .015; .12 MG/D; MG/D
1 RING VAGINAL
Qty: 1 EACH | Refills: 11 | Status: SHIPPED | OUTPATIENT
Start: 2023-05-08 | End: 2024-05-07

## 2023-05-17 DIAGNOSIS — Z12.11 SCREENING FOR COLON CANCER: Primary | ICD-10-CM

## 2023-05-18 ENCOUNTER — TELEPHONE (OUTPATIENT)
Dept: GASTROENTEROLOGY | Facility: CLINIC | Age: 43
End: 2023-05-18
Payer: MEDICAID

## 2023-08-01 DIAGNOSIS — I10 HYPERTENSION, UNSPECIFIED TYPE: ICD-10-CM

## 2023-08-01 RX ORDER — HYDROCHLOROTHIAZIDE 25 MG/1
25 TABLET ORAL
Qty: 30 TABLET | Refills: 0 | Status: SHIPPED | OUTPATIENT
Start: 2023-08-01